# Patient Record
Sex: MALE | Race: WHITE | Employment: OTHER | URBAN - METROPOLITAN AREA
[De-identification: names, ages, dates, MRNs, and addresses within clinical notes are randomized per-mention and may not be internally consistent; named-entity substitution may affect disease eponyms.]

---

## 2021-01-01 ENCOUNTER — HOSPITAL ENCOUNTER (EMERGENCY)
Age: 86
Discharge: HOME | End: 2021-10-21
Attending: EMERGENCY MEDICINE
Payer: MEDICARE

## 2021-01-01 ENCOUNTER — TRANSCRIBE ORDER (OUTPATIENT)
Dept: SCHEDULING | Age: 86
End: 2021-01-01

## 2021-01-01 ENCOUNTER — HOSPITAL ENCOUNTER (OUTPATIENT)
Dept: ULTRASOUND IMAGING | Age: 86
Discharge: HOME | End: 2021-10-21
Attending: INTERNAL MEDICINE
Payer: MEDICARE

## 2021-01-01 ENCOUNTER — HOSPITAL ENCOUNTER (OUTPATIENT)
Dept: PHYSICAL THERAPY | Age: 86
Discharge: HOME | End: 2021-10-20
Payer: MEDICARE

## 2021-01-01 ENCOUNTER — HOSPITAL ENCOUNTER (OUTPATIENT)
Dept: GENERAL RADIOLOGY | Age: 86
Discharge: HOME | End: 2021-10-21
Attending: NURSE PRACTITIONER
Payer: MEDICARE

## 2021-01-01 ENCOUNTER — HOSPITAL ENCOUNTER (OUTPATIENT)
Dept: WOUND CARE | Age: 86
Discharge: HOME | End: 2021-12-15
Payer: MEDICARE

## 2021-01-01 ENCOUNTER — OFFICE VISIT (OUTPATIENT)
Dept: INTERNAL MEDICINE CLINIC | Age: 86
End: 2021-01-01
Payer: MEDICARE

## 2021-01-01 VITALS
SYSTOLIC BLOOD PRESSURE: 111 MMHG | HEART RATE: 63 BPM | RESPIRATION RATE: 20 BRPM | DIASTOLIC BLOOD PRESSURE: 60 MMHG | OXYGEN SATURATION: 98 % | WEIGHT: 120 LBS | BODY MASS INDEX: 17.18 KG/M2 | HEIGHT: 70 IN

## 2021-01-01 VITALS
RESPIRATION RATE: 18 BRPM | DIASTOLIC BLOOD PRESSURE: 70 MMHG | HEIGHT: 70 IN | HEART RATE: 69 BPM | SYSTOLIC BLOOD PRESSURE: 135 MMHG | TEMPERATURE: 98.1 F | OXYGEN SATURATION: 96 %

## 2021-01-01 VITALS
RESPIRATION RATE: 19 BRPM | HEIGHT: 70 IN | SYSTOLIC BLOOD PRESSURE: 123 MMHG | BODY MASS INDEX: 19.16 KG/M2 | DIASTOLIC BLOOD PRESSURE: 66 MMHG | OXYGEN SATURATION: 98 % | WEIGHT: 133.82 LBS | TEMPERATURE: 97.3 F | HEART RATE: 76 BPM

## 2021-01-01 VITALS
RESPIRATION RATE: 16 BRPM | HEART RATE: 65 BPM | TEMPERATURE: 98.8 F | DIASTOLIC BLOOD PRESSURE: 83 MMHG | SYSTOLIC BLOOD PRESSURE: 192 MMHG

## 2021-01-01 DIAGNOSIS — L89.151 PRESSURE INJURY OF SACRAL REGION, STAGE 1: ICD-10-CM

## 2021-01-01 DIAGNOSIS — J90 PLEURAL EFFUSION: Primary | ICD-10-CM

## 2021-01-01 DIAGNOSIS — R07.9: ICD-10-CM

## 2021-01-01 DIAGNOSIS — G89.18 POST-OP PAIN: Primary | ICD-10-CM

## 2021-01-01 DIAGNOSIS — M25.561 RIGHT KNEE PAIN, UNSPECIFIED CHRONICITY: ICD-10-CM

## 2021-01-01 DIAGNOSIS — J90 PLEURAL EFFUSION: ICD-10-CM

## 2021-01-01 DIAGNOSIS — C34.90 SMALL CELL LUNG CANCER (HCC): ICD-10-CM

## 2021-01-01 DIAGNOSIS — K21.9 GASTROESOPHAGEAL REFLUX DISEASE, UNSPECIFIED WHETHER ESOPHAGITIS PRESENT: ICD-10-CM

## 2021-01-01 DIAGNOSIS — R32 URINARY INCONTINENCE, UNSPECIFIED TYPE: Primary | ICD-10-CM

## 2021-01-01 DIAGNOSIS — M25.512 LEFT SHOULDER PAIN, UNSPECIFIED CHRONICITY: Primary | ICD-10-CM

## 2021-01-01 DIAGNOSIS — I89.0 LYMPHEDEMA: ICD-10-CM

## 2021-01-01 LAB
ALBUMIN SERPL-MCNC: 2.9 G/DL (ref 3.5–5)
ALBUMIN/GLOB SERPL: 0.8 {RATIO} (ref 1.1–2.2)
ALP SERPL-CCNC: 215 U/L (ref 45–117)
ALT SERPL-CCNC: 14 U/L (ref 12–78)
ANION GAP SERPL CALC-SCNC: 3 MMOL/L (ref 5–15)
AST SERPL-CCNC: 25 U/L (ref 15–37)
BASOPHILS # BLD: 0 K/UL (ref 0–0.1)
BASOPHILS NFR BLD: 0 % (ref 0–1)
BILIRUB SERPL-MCNC: 0.4 MG/DL (ref 0.2–1)
BUN SERPL-MCNC: 30 MG/DL (ref 6–20)
BUN/CREAT SERPL: 39 (ref 12–20)
CALCIUM SERPL-MCNC: 9.3 MG/DL (ref 8.5–10.1)
CHLORIDE SERPL-SCNC: 100 MMOL/L (ref 97–108)
CO2 SERPL-SCNC: 34 MMOL/L (ref 21–32)
COMMENT, HOLDF: NORMAL
CREAT SERPL-MCNC: 0.76 MG/DL (ref 0.7–1.3)
DIFFERENTIAL METHOD BLD: ABNORMAL
EOSINOPHIL # BLD: 0.1 K/UL (ref 0–0.4)
EOSINOPHIL NFR BLD: 1 % (ref 0–7)
ERYTHROCYTE [DISTWIDTH] IN BLOOD BY AUTOMATED COUNT: 15.2 % (ref 11.5–14.5)
GLOBULIN SER CALC-MCNC: 3.6 G/DL (ref 2–4)
GLUCOSE SERPL-MCNC: 94 MG/DL (ref 65–100)
HCT VFR BLD AUTO: 38.1 % (ref 36.6–50.3)
HGB BLD-MCNC: 11.9 G/DL (ref 12.1–17)
IMM GRANULOCYTES # BLD AUTO: 0.1 K/UL (ref 0–0.04)
IMM GRANULOCYTES NFR BLD AUTO: 1 % (ref 0–0.5)
LYMPHOCYTES # BLD: 1.2 K/UL (ref 0.8–3.5)
LYMPHOCYTES NFR BLD: 12 % (ref 12–49)
MCH RBC QN AUTO: 29 PG (ref 26–34)
MCHC RBC AUTO-ENTMCNC: 31.2 G/DL (ref 30–36.5)
MCV RBC AUTO: 92.9 FL (ref 80–99)
MONOCYTES # BLD: 1 K/UL (ref 0–1)
MONOCYTES NFR BLD: 9 % (ref 5–13)
NEUTS SEG # BLD: 8 K/UL (ref 1.8–8)
NEUTS SEG NFR BLD: 77 % (ref 32–75)
NRBC # BLD: 0 K/UL (ref 0–0.01)
NRBC BLD-RTO: 0 PER 100 WBC
PLATELET # BLD AUTO: 572 K/UL (ref 150–400)
PMV BLD AUTO: 10 FL (ref 8.9–12.9)
POTASSIUM SERPL-SCNC: 5.6 MMOL/L (ref 3.5–5.1)
PROT SERPL-MCNC: 6.5 G/DL (ref 6.4–8.2)
RBC # BLD AUTO: 4.1 M/UL (ref 4.1–5.7)
SAMPLES BEING HELD,HOLD: NORMAL
SODIUM SERPL-SCNC: 137 MMOL/L (ref 136–145)
WBC # BLD AUTO: 10.3 K/UL (ref 4.1–11.1)

## 2021-01-01 PROCEDURE — 99284 EMERGENCY DEPT VISIT MOD MDM: CPT

## 2021-01-01 PROCEDURE — 71045 X-RAY EXAM CHEST 1 VIEW: CPT

## 2021-01-01 PROCEDURE — 96374 THER/PROPH/DIAG INJ IV PUSH: CPT

## 2021-01-01 PROCEDURE — 99204 OFFICE O/P NEW MOD 45 MIN: CPT | Performed by: INTERNAL MEDICINE

## 2021-01-01 PROCEDURE — 74011250636 HC RX REV CODE- 250/636: Performed by: EMERGENCY MEDICINE

## 2021-01-01 PROCEDURE — 74011000250 HC RX REV CODE- 250: Performed by: INTERNAL MEDICINE

## 2021-01-01 PROCEDURE — G0463 HOSPITAL OUTPT CLINIC VISIT: HCPCS | Performed by: INTERNAL MEDICINE

## 2021-01-01 PROCEDURE — 74011000250 HC RX REV CODE- 250: Performed by: EMERGENCY MEDICINE

## 2021-01-01 PROCEDURE — 97162 PT EVAL MOD COMPLEX 30 MIN: CPT

## 2021-01-01 PROCEDURE — 74011250637 HC RX REV CODE- 250/637: Performed by: EMERGENCY MEDICINE

## 2021-01-01 PROCEDURE — G8421 BMI NOT CALCULATED: HCPCS | Performed by: INTERNAL MEDICINE

## 2021-01-01 PROCEDURE — G8427 DOCREV CUR MEDS BY ELIG CLIN: HCPCS | Performed by: INTERNAL MEDICINE

## 2021-01-01 PROCEDURE — 99213 OFFICE O/P EST LOW 20 MIN: CPT

## 2021-01-01 PROCEDURE — 77030028236 US THORACENTESIS CATH W IMAGE

## 2021-01-01 PROCEDURE — 99203 OFFICE O/P NEW LOW 30 MIN: CPT | Performed by: SURGERY

## 2021-01-01 PROCEDURE — G8536 NO DOC ELDER MAL SCRN: HCPCS | Performed by: INTERNAL MEDICINE

## 2021-01-01 PROCEDURE — 97140 MANUAL THERAPY 1/> REGIONS: CPT

## 2021-01-01 PROCEDURE — G8510 SCR DEP NEG, NO PLAN REQD: HCPCS | Performed by: INTERNAL MEDICINE

## 2021-01-01 PROCEDURE — 96375 TX/PRO/DX INJ NEW DRUG ADDON: CPT

## 2021-01-01 PROCEDURE — 1101F PT FALLS ASSESS-DOCD LE1/YR: CPT | Performed by: INTERNAL MEDICINE

## 2021-01-01 RX ORDER — OXYCODONE AND ACETAMINOPHEN 5; 325 MG/1; MG/1
1 TABLET ORAL
Qty: 12 TABLET | Refills: 0 | Status: SHIPPED | OUTPATIENT
Start: 2021-01-01 | End: 2021-01-01

## 2021-01-01 RX ORDER — LIDOCAINE HYDROCHLORIDE 10 MG/ML
8 INJECTION, SOLUTION EPIDURAL; INFILTRATION; INTRACAUDAL; PERINEURAL
Status: COMPLETED | OUTPATIENT
Start: 2021-01-01 | End: 2021-01-01

## 2021-01-01 RX ORDER — OXYCODONE HYDROCHLORIDE 5 MG/1
5 TABLET ORAL
Status: DISCONTINUED | OUTPATIENT
Start: 2021-01-01 | End: 2021-01-01

## 2021-01-01 RX ORDER — OXYCODONE HYDROCHLORIDE 5 MG/1
5 CAPSULE ORAL DAILY
COMMUNITY
End: 2022-01-01 | Stop reason: ALTCHOICE

## 2021-01-01 RX ORDER — LIDOCAINE 4 G/100G
1 PATCH TOPICAL
Status: DISCONTINUED | OUTPATIENT
Start: 2021-01-01 | End: 2021-01-01 | Stop reason: HOSPADM

## 2021-01-01 RX ORDER — KETOROLAC TROMETHAMINE 30 MG/ML
15 INJECTION, SOLUTION INTRAMUSCULAR; INTRAVENOUS
Status: COMPLETED | OUTPATIENT
Start: 2021-01-01 | End: 2021-01-01

## 2021-01-01 RX ORDER — MORPHINE SULFATE 2 MG/ML
4 INJECTION, SOLUTION INTRAMUSCULAR; INTRAVENOUS
Status: COMPLETED | OUTPATIENT
Start: 2021-01-01 | End: 2021-01-01

## 2021-01-01 RX ORDER — OXYCODONE AND ACETAMINOPHEN 5; 325 MG/1; MG/1
1 TABLET ORAL
Status: COMPLETED | OUTPATIENT
Start: 2021-01-01 | End: 2021-01-01

## 2021-01-01 RX ORDER — AMOXICILLIN 250 MG
1 CAPSULE ORAL DAILY
COMMUNITY

## 2021-01-01 RX ADMIN — LIDOCAINE HYDROCHLORIDE 8 ML: 10 INJECTION, SOLUTION EPIDURAL; INFILTRATION; INTRACAUDAL; PERINEURAL at 14:00

## 2021-01-01 RX ADMIN — KETOROLAC TROMETHAMINE 15 MG: 30 INJECTION, SOLUTION INTRAMUSCULAR at 13:19

## 2021-01-01 RX ADMIN — OXYCODONE AND ACETAMINOPHEN 1 TABLET: 5; 325 TABLET ORAL at 14:00

## 2021-01-01 RX ADMIN — MORPHINE SULFATE 4 MG: 2 INJECTION, SOLUTION INTRAMUSCULAR; INTRAVENOUS at 13:33

## 2021-01-01 RX ADMIN — SODIUM CHLORIDE 500 ML: 9 INJECTION, SOLUTION INTRAVENOUS at 13:32

## 2021-10-05 PROBLEM — I89.0 LYMPHEDEMA: Status: ACTIVE | Noted: 2021-01-01

## 2021-10-05 NOTE — PROGRESS NOTES
Chief Complaint   Patient presents with   [address]     NP       Pt is in assisted living at [address] [facility] on [address]. Pt moved from [address] [place] [address] 2 months ago. Dr. Violetta Rainey. Pt c/o urinary incontinence. Pt is in a wheelchair due to bilateral leg lymphedema due to cancer therapy.

## 2021-10-05 NOTE — PROGRESS NOTES
PROGRESS NOTE  Name: Jd Harmon   : 1929       ASSESSMENT/ PLAN:     Diagnoses and all orders for this visit:    Urinary incontinence, unspecified type:   -     REFERRAL TO UROLOGY    Small cell lung cancer [facility]): S/p chemotherapy. -     METABOLIC PANEL, COMPREHENSIVE; Future  -     CBC WITH AUTOMATED DIFF; Future    Bilateral lymphedema: Discussed compression and elevation. L pleural effusion: S/p repeated thoracentesis; now seeing Dr. Jeffry Lyles, pulmonologist.     Gastroesophageal reflux disease, unspecified whether esophagitis present: Stable. Follow-up and Dispositions  ·   Return in about 6 months (around 2022). I have reviewed the patient's medications and risks/side effects/benefits were discussed. Diagnosis(-es) explained to patient and questions answered. Literature provided where appropriate. SUBJECTIVE:   Mr. Jd Harmon is a 80 y.o. male who is here for follow up of routine medical issues. He is here with his son in law. Chief Complaint   Patient presents with   [facility].     NP       He has issues with incontinence, for about a year; \"I cannot control the urination flow. \" Worst at night--wears diapers and \"plastic swimming trunks. \" Has bed pads and absorbent sheets. He has tried finasteride. He has seen a urologist in the past, who did cystoscopy and \"said my bladder was hard. \"     He has been confined to wheelchair since quadriceps tear two years ago. Had surgery. He has stage IV lung cancer diagnosed 2017. Small cell lung CA, treated with xyzkori. \"That caused the edema to blow up; the lymph system just went wild. \"  Gets periodic scans. He has chronic pleural effusion, and Dr. Jeffry Lyles follows; plans to do another thoracentesis in near future. GERD: Doing okay on intermittent nexium. He has in the past seen gastroenterologist, and had EGD.      At this time, he is otherwise doing well and has brought no other complaints to my attention today. For a list of the medical issues addressed today, see the assessment and plan below. PMH:   Past Medical History:   Diagnosis Date    Cancer ([facility] [address]     Stage 4 Lung Cancer    GERD (gastroesophageal reflux disease)     Urinary incontinence        Past Surgical History:   Procedure Laterality Date    HX ORTHOPAEDIC      foot    HX ROTATOR CUFF REPAIR Left        All: He has No Known Allergies. Current Outpatient Medications   Medication Sig    esomeprazole magnesium (NEXIUM 24HR PO) Take  by mouth. No current facility-administered medications for this visit. FH: His family history is not on file. SH: Retired . He reports that he quit smoking about 30 years ago. He has never used smokeless tobacco. He reports previous alcohol use. He reports that he does not use drugs. ROS: See above; Complete ROS otherwise negative. OBJECTIVE:   Vitals:   Visit Vitals  /70 (BP 1 Location: Right arm, BP Patient Position: Sitting, BP Cuff Size: Small adult)   Pulse 69   Temp 98.1 °F (36.7 °C) (Temporal)   Resp 18   Ht 5' 10\" (1.778 m)   SpO2 96%      Gen: Pleasant 80 y.o.  male in NAD. He is sitting in a wheelchair. He is somewhat hard of hearing. HEENT: PERRLA. EOMI. OP moist and pink. Neck: Supple. No LAD. HEART: RRR, No M/G/R.    LUNGS: Diminished BS R lung base to mid lung. ABDOMEN: S, NT, ND, BS+. EXTREMITIES: Warm. Bilateral 2-3+ edema from mid shin distally. MUSCULOSKELETAL: Wheelchair bound. NEURO: Alert and oriented x 3. Cranial nerves grossly intact. No focal sensory or motor deficits noted. SKIN: Warm. Dry. Stasis dermatitis bilateral shins.

## 2021-10-20 NOTE — PROGRESS NOTES
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 [x]   C34.91 Malignant neoplasm of lung   [x]   I89.0 Lymphedema, other secondary BLE []   I83.019 Venous Stasis Ulcer LE, Right   []   I89.9 Unspecified Lymphatic Disorder []   I83.029 Venous Stasis Ulcer LE, Left   [x]   R60.9 Swelling not relieved by elevation []   Q82.0 Hereditary/ Congenital Lymphedema   []   C50.211 Malignant neoplasm of breast, Right []   G89.3  Cancer associated pain   []   C50.212 Malignant neoplasm of breast, Left []   L03.115 LE Cellulitis, Right     []   L03.116 LE Cellulitis, Left                         Recommended Product for Diagnosis as noted above:  Units Upper Extremity Rt Lt Units Lower Extremity Rt Lt    Compression Multi-Layered Bandages    Compression Multi-Layered Bandages      Circ-Aid, Ready Wrap, Sigvaris Arm   2 Inelastic binders (Circ-Aid, Farrow)  [x]Foot   [x]Below Knee   []Knee   []Thigh X X    Circ-Aid Ready Wrap, Sigvaris hand    Martin Tony, Leg, night use      Tribute Arm, night use   2 Tribute Leg, night use      Martin Tony Arm, night use    Zacarias Sleeve Leg/ Foot, night use      Vasopneumatic Compression Pump  []Arm []Arm w/Shoulder  []Arm w/Vest    Vasopneumatic Compression Pump  []Leg         []Trunk       []Pant      Gradiant Compression Sleeves & Gloves  Custom/ RM Arm:  []CCL1 []CCL2 []CCL3  Custom/ RM Glove: []CCL1 []CCL2 []CCL3      Gradient Compression Stockings  Custom/ RM Lower Extremity:   []CCL1       []CCL2         []CCL3   []Knee      []Thigh        []Full Length      Zacarias sleeve arm w/ hand, night use   2 Tribute Wrap, night use      Compression Bra    Compression Vest          Compression Multi-Layered Bandages         The above patient was referred for treatment of Lymphedema due to the diagnosis indicated above. Lymphedema is a progressive and chronic condition. It may cause acute infections, muscle atrophy, discomfort, and connective tissue fibrosis with irreversible tissue damage, decreased ROM in the affected limb and diminished functional mobility possibly interfering with independence and ability to work. Recurrent infections and wound complications that commonly occur with Lymphedema often require hospitalization and extensive wound care, thus increasing medical costs. The patient has received complete decongestive therapy which includes manual lymphatic drainage, lymphedema specific exercises, compression bandaging, and hygiene/skin care. Goals of therapy are to reduce the edema and prevent re-accumulation of fluid with its complications. It is medically necessary for this patient to have daytime garments to control this condition. They will need 2 sets (one set to wear and one set to wash for adequate skin care and wearing time). Garments must be replaced every 4-6 months for effectiveness. There are no substitutes available that offer acceptable compression treatment for this Lymphedema patient. If further information is requested, please contact our certified lymphedema therapists at [phone].     [] Hi Espinoza, PT, MSPT, CLT-RAMÍREZ [] Melchor Damon, MS, OTR/L, CLT [x]  Mari Roach, PT, CLT [] Tamy Torres, PTA, CLT, CSIS    Printed MD Name  MD Signature  Date

## 2021-10-20 NOTE — PROGRESS NOTES
[facility]  Lymphedema Clinic  [address] [facility], [address] [place] [address], [facility] [address].    INITIAL EVALUATION    NAME: Gertrudis Medina AGE: 80 y.o. GENDER: male  DATE: 10/20/2021  REFERRING PHYSICIAN: LITTLE Feliz  HISTORY AND BACKGROUND:   Primary Diagnosis:  · Lymphedema, not elsewhere classified B LE (I89.0)  Other Treatment Diagnoses:   Abnormality of gait (R26.9)   Swelling not relieved by elevation (R60.9)   Morbid obesity (E66.01)  · Malignant neoplasm of lung (C34.91)  Date of Onset: 2017  Present Symptoms and Functional Limitations: Patient reports that swelling in the legs began in 2017 approximately 3 weeks after beginning chemotherapy for stage IV lung cancer. Patient had home health services that applied multi-layer compression bandaging to the legs for 2 to 3 years. He also obtained a Flexitouch vaso-pneumatic device at that time for management of swelling but he disposed of the pump since it did not seem to work well. He reports never receiving  compression products for the legs. He recently moved from [place] to be near his daughter and was referred to our clinic for assessment of swelling and to explore available compression options. Patient reports that swelling in the legs has improved except for the lower legs and feet since making the appointment in our clinic. Lymphedema Life Impact Scale (LLIS): scores a 10 with 16% impairment. Past Medical History:   Past Medical History:   Diagnosis Date    Cancer ([facility] [address]     Stage 4 Lung Cancer    GERD (gastroesophageal reflux disease)     Lymphedema     Urinary incontinence      Past Surgical History:   Procedure Laterality Date    HX ORTHOPAEDIC Left     Achilles     HX ORTHOPAEDIC Right     knee surery    HX ROTATOR CUFF REPAIR Left      Current Medications:    Current Outpatient Medications   Medication Sig    esomeprazole magnesium (NEXIUM 24HR PO) Take  by mouth.      No current facility-administered medications for this encounter. Allergies: No Known Allergies     Prior Level of Function/Social/Work History/Personal factors and/or comorbidities impacting plan of care: Patient is recently  and moved to [place] to be near his daughter. He has a history of stage IV lung cancer with previous chemotherapy treatments. Living Situation: Lives in [facility]. Trainable Caregiver?: Possibly daughter or staff in the facility. Mobility: Patient ambulates short distances with rolling walker but is unsteady. Patient uses a transport chair or scooter for community distances. Sleeping Arrangement:  in bed at night  Adaptive Equipment Owned: rolling walker, scooter, transport chair, reclining lift chair. Previous Therapy:  Home health services in [place] - consisted of multi-layer compression bandaging  Compression/Lymphedema Equipment: None     SUBJECTIVE:   Patient is transported dependently by wheelchair and is accompanied by his daughter. She assists in answering questions since patient is hard of hearing. Patient reports having swelling in the legs ever since beginning chemotherapy. He tolerated bandaging for a couple of years but never received any compression products. The swelling tends to be in the lower legs and feet. Patients goals for therapy: decrease swelling. OBJECTIVE DATA SUMMARY:   EXAMINATION/PRESENTATION/DECISION MAKING:   Pain:   No reports of pain. Self-Care and ADLs:  Modified independent with increased time for dressing. Difficulty reaching his feet. Has assistance for showering one day each week. Skin and Tissue Assessment:  Dermal Status: Intact, several scratches on the legs - no open areas or signs of infection.      Texture/Consistency: Boggy lower legs and feet bilaterally     Pigmentation/Color Change: Hyperpigmented lower legs    Anomalies: N/A    Circulatory: Varicosities    Nails: Normal    Stemmers Sign: Positive dorsum of the feet bilaterally    Wound/Ulcer:  None        Volumetric Measurements:   Right:  6,014. 31 mL Left:  5,794.67 mL   % Difference: 3.79% R LE > L LE Dominance: R     Range of Motion: B shoulders actively to 40 degrees due to torn rotator cuff tears, patient unable to extend the R knee against gravity due to torn quadriceps tendon. Otherwise, range of motion is generally decreased but functional.   Strength: UE strength is impaired proximally due to B rotator cuff tears and R quad strength is 2/5. Strength is generally decreased but functional.   Sensation:  Intact     Mobility:    Bed/Chair Mobility: Minimum assistance  Transfers: Minimum assistance  Gait: Supervision with rolling walker approximately 10 feet wheelchair<>mat table. Endurance: fair - sedentary    Safety:  Patient is alert and oriented: Yes  Safety awareness: appears intact  Fall risk?: yes  Patient given written fall prevention handout: Yes    Based on the above components, the patient evaluation is determined to be of the following complexity level: MEDIUM    Evaluation Time: 1:50-2:20 pm    TREATMENT PROVIDED:   1. Treatment description:  Manual Therapy: Patient instructed in skin care principles and anatomy of the lymphatic system. Therapist able to demonstrate manual lymphatic drainage techniques for the drainage of LE's and skin care protocol: Discussed daily skin care with assistance using manual lymphatic drainage techniques. Provided patient with a list of recommended lotions. Discussed the increased risk of infection with lymphedema and signs and symptoms of infection. Discussed trying to lie in bed for 15 minute intervals throughout the day to reduce the effect of gravity on swelling. Discussed the importance of the muscle pump function and provided patient with written instructions for the Active ROM routine.  Therapist demonstrated the routine and patient able to perform all exercises except for R knee extension. Patient encouraged to perform the routine each day as tolerated. Patient tolerated multi-layer compression bandaging during previous treatment in [place]. Patient and daughter are not interested in pursuing bandaging at this time due to the time commitment and patient having multiple medical appointments. Provided samples of compression products, including flat-knit, velcro, and foam, products and demonstrated don/doff technique of the products. Patient prefers to be independent with management of compression products and voiced interest in ordering: two [employer] Compreflex standard calf units size medium/regular length with one extender strap and two pairs of Circaid compressive undersocks 25-35 mmHg in the size large, if covered by insurance. Will submit the garment order to the vendor, [employer], for processing. Discussed that delivery of the compression products may take several weeks due to the insurance authorization process and patient and daughter verbalize understanding. Patient will return to the clinic for fitting of the products and prefer not to make any other appointments at this time. Treatment time:  2:20-3:05 pm  Minutes: 39  ASSESSMENT:   Jayro Castillo is a 80 y.o. male who presents with secondary B LE lymphedema following diagnosis and treatment of stage IV lung cancer. Patient's condition is complicated by sedentary lifestyle due to general debility, history of falls, and torn R quad tendon. Patient is able to ambulate short distances with rolling walker but uses a scooter for community distances. Patient tolerated bandaging of the legs in the past but is now looking for another option for management of swelling. He prefers to manage his self care without assistance if possible.  Patient voiced interest in ordering velcro compression products with compressive undersocks for management of swelling and may be interested in ordering night time foam products in the future. Patient and daughter will return to the clinic in a few weeks for fitting of the products but prefer to limit appointments in our clinic since patient has multiple medical appointments and relies on family for transportation. Patient is motivated to improve his condition and will benefit from complete decongestive therapy (CDT) including: Manual lymphatic drainage (MLD) technique, Short-stretch textile bandages/compression system to decongest limb and Kinesiotaping as appropriate. This care is medically necessary due to the infection risk with lymphedema and to improve functional activities. CDT is necessary to resolve swelling to allow patient to return to wearing normal clothes/footwear and prevent worsening of symptoms, such as infections and/or hospitalizations. Patient will be independent with home program strategies to allow improved ADL ability and mobility and to allow patient to return to greatest functional independence. Rehabilitation potential is considered to be Fair. Factors which may influence rehabilitation potential include stage IV lung cancer, general debility, sedentary lifestyle, and relying on family members for transportation but good family support. Patient will benefit from 2 to 5 physical therapy visits over 12 weeks to optimize improvement in these areas. PLAN OF CARE:   Recommendations and Planned Interventions: Manual lymph drainage/decongestive therapy, Multi-layer compression bandaging (short-stretch), Compression garment fitting/provision, Lymphedema therapeutic exercise, AROM/PROM/Strength/Coordination, Self-care training, Functional mobility training, Education in skin care and lymphedema precautions, Self-MLD education per home program, Caregiver education as needed and Would care as needed    GOALS  Short term goals  Time frame: to be met by 11/24/21  1.   Patient will demonstrate knowledge of signs/symptoms of infections/cellulitis and be independent in skin care to prevent cellulitis. 2.  Patient will demonstrate independence in lymphedema home program of therapeutic exercises to improve circulation and decongest limb to improve ADLs. 3.  Patient will tolerate multi-layer bandages (MLB) and show measureable decrease in limb volume and/or participate in the selection process to allow ordering of home compression system (daytime, nighttime garments and pump as needed). Long term goals  Time frame: to be met by 1/15/21  1. Pt will show improvement in the Lymphedema Life Impact Scale (LLIS) by decreasing the score to 8 and thus allow improvement in patient's quality of life. 2.  Patient will be independent with don/doff of compression system and use in order to prevent reaccumulation of fluid at discharge. 3.  Pt will be independent in self-MLD and show stable limb volumes showing decongestion and pt. ready for transition to independent restorative phase of lymphedema therapy. Patient has participated in goal setting and agrees to work toward plan of care. Patient was instructed to call if questions or concerns arise. Thank you for this referral.  Miguel Palomino, PT, CLT     Total timed codes: 45 minutes  1:1 Treatment time: 45 minutes    TREATMENT PLAN EFFECTIVE DATES:   10/20/2021 TO 1/15/2021  I have read the above plan of care for [facility]. I certify the above prescribed services are required by this patient and are medically necessary.   The above plan of care has been developed in conjunction with the lymphedema/physical therapist.       Physician Signature: ____________________________________Date:______________     LITTLE Rubio

## 2021-10-21 NOTE — PROGRESS NOTES
Pt c/o pain after thora completed, Pt asking for a Percocet. Xray read by radiologist, ok'd for d/c. Pt still complaining of pain, VSS, Pulse ox 96% on room air.   Pt taken to ER per his request.

## 2021-10-21 NOTE — DISCHARGE INSTRUCTIONS
[address]  Special Procedures/Radiology Department    Radiologist: Lemuel Cuba NP      Date: 10/21/21      Thoracentesis Discharge Instructions    You may have an aching pain in the puncture site tonight. Take Tylenol, as directed on the label, for pain or discomfort. Avoid ibuprofen (Advil, Motrin) and aspirin products for the next 48 hours as these drugs may cause you to bleed. Resume your previous diet and follow the medication reconciliation form. Rest for the next 24 hours. If you experience shortness of breath (other than your normal breathing pattern) difficulty breathing, or have severe chest pain, call 911 and go to the nearest Emergency Room.     Be sure to follow up with your referring physician as soon as possible    Other:

## 2021-10-21 NOTE — ED NOTES
Pt arrives from IR  d/t R lung pain after thoracentesis today. Procedure removed 700 cc, pt reporting 10/10 pain, R lung sounds diminished. Pt has hx of lung cx, previous thoracentesis caused similar discomfort and pt required hospitaliszation and pain control. Per daughter, pt responds well to toradol and fentanyl     1315 Spoke w/ MD, to placed orders, pt becoming increasingly demanding and stating \"I'm dying\"     1315 MD at bedside    1330 Pt medicated per orders     1400 Pt medicated per orders     1510 Pt discharged by Dr Javi Castle. Pt provided with discharge instructions Rx and instructions on follow up care. Pt out of ED via wheelchair accompanied by son-in-law.

## 2021-10-21 NOTE — ED PROVIDER NOTES
EMERGENCY DEPARTMENT HISTORY AND PHYSICAL EXAM      Date: 10/21/2021  Patient Name: Joyce Urbina  Patient Age and Sex: 80 y.o. male     History of Presenting Illness     Chief Complaint   Patient presents with    Lung Cancer     Pt arrives from IR  d/t R lung pain after thoracentesis today. Procedure removed 700 cc, pt reporting 10/10 pain, R lung sounds diminished. Pt has hx of lung cx, previous thoracentesis caused similar discomfort and pt required hospitaliszation and pain control. Per daughter, pt responds well to toradol and fentanyl        History Provided By: Patient, son    HPI: Joyce Urbina is a 69-year-old male with a history of stage IV lung cancer with recurrent malignant effusions presenting with pain. According to patient and son, earlier today he had a thoracentesis. According to son, he has now had for 5 thoracentesis and in the last couple of times it happened he always has pain at the site. Complaining of right lower chest and right mid back pain where the effusions usually are. Patient crying out in pain. According to family members responded well to Toradol and fentanyl last time. Patient just yelling out that he is in pain. There are no other complaints, changes, or physical findings at this time. PCP: Real Mckeon MD    Current Facility-Administered Medications on File Prior to Encounter   Medication Dose Route Frequency Provider Last Rate Last Admin    [COMPLETED] lidocaine (PF) (XYLOCAINE) 10 mg/mL (1 %) injection 8 mL  8 mL SubCUTAneous RAD ONCE Juliet Toledo , MD   8 mL at 10/21/21 1400     Current Outpatient Medications on File Prior to Encounter   Medication Sig Dispense Refill    esomeprazole magnesium (NEXIUM 24HR PO) Take  by mouth.          Past History     Past Medical History:  Past Medical History:   Diagnosis Date    Cancer [facility])     Stage 4 Lung Cancer    GERD (gastroesophageal reflux disease)     Lymphedema     Urinary incontinence        Past Surgical History:  Past Surgical History:   Procedure Laterality Date    HX ORTHOPAEDIC Left     Achilles     HX ORTHOPAEDIC Right     knee surery    HX ROTATOR CUFF REPAIR Left        Family History:  No family history on file. Social History:  Social History     Tobacco Use    Smoking status: Former Smoker     Quit date: 10/5/1991     Years since quittin.0    Smokeless tobacco: Never Used   Vaping Use    Vaping Use: Never used   Substance Use Topics    Alcohol use: Not Currently    Drug use: Never       Allergies:  No Known Allergies      Review of Systems   Review of Systems   Constitutional: Negative for chills and fever. Respiratory: Negative for cough and shortness of breath. Cardiovascular: Positive for chest pain. Gastrointestinal: Negative for abdominal pain, constipation, diarrhea, nausea and vomiting. Genitourinary: Negative for dysuria, frequency and hematuria. Musculoskeletal: Positive for back pain. Neurological: Negative for weakness and numbness. All other systems reviewed and are negative. Physical Exam   Physical Exam  Constitutional:       General: He is not in acute distress. Appearance: He is well-developed. Comments: Thin cachectic male   HENT:      Head: Normocephalic and atraumatic. Nose: Nose normal.      Mouth/Throat:      Mouth: Mucous membranes are moist.   Eyes:      Extraocular Movements: Extraocular movements intact. Conjunctiva/sclera: Conjunctivae normal.   Cardiovascular:      Comments: Well perfused  Pulmonary:      Effort: Pulmonary effort is normal. No respiratory distress. Comments: Thin male. Tender palpation over the right anterior inferior chest wall as well as over the right mid back around T6 area. No ecchymosis or erythema noted. Musculoskeletal:         General: Normal range of motion. Cervical back: Normal range of motion. Neurological:      General: No focal deficit present.       Mental Status: He is alert and oriented to person, place, and time. Psychiatric:      Comments: Very anxious male          Diagnostic Study Results     Labs -     Recent Results (from the past 12 hour(s))   SAMPLES BEING HELD    Collection Time: 10/21/21 12:00 PM   Result Value Ref Range    SAMPLES BEING HELD BODY FLUID     COMMENT        Add-on orders for these samples will be processed based on acceptable specimen integrity and analyte stability, which may vary by analyte. Radiologic Studies -   No orders to display     CT Results  (Last 48 hours)    None        CXR Results  (Last 48 hours)               10/21/21 1310  XR CHEST PORT Final result    Impression:  Moderate size right pleural effusion. Narrative:  Clinical indication: Thoracentesis. Portable AP view of the chest obtained, there is a moderate-sized right pleural   effusion, no shift possibly indicating underlying loss of volume. The left lung   is clear. There is no pneumothorax. Medical Decision Making   I am the first provider for this patient. I reviewed the vital signs, available nursing notes, past medical history, past surgical history, family history and social history. Vital Signs-Reviewed the patient's vital signs. Patient Vitals for the past 12 hrs:   Temp Pulse Resp BP SpO2   10/21/21 1430  76 19 123/66    10/21/21 1400  (!) 54 21 (!) 112/49 98 %   10/21/21 1330  (!) 52 27 106/61 98 %   10/21/21 1301 97.3 °F (36.3 °C) (!) 53 22 (!) 109/55 100 %       Records Reviewed: Nursing Notes and Old Medical Records    Provider Notes (Medical Decision Making):   Patient with a history of recurrent malignant effusion secondary to his lung cancer and history of pain after thoracentesis presenting once again with pain after thoracentesis. We will try to treat his pain with IV analgesics. I do think there is a component of anxiety here as well. ED Course:   Initial assessment performed.  The patients presenting problems have been discussed, and they are in agreement with the care plan formulated and outlined with them. I have encouraged them to ask questions as they arise throughout their visit. ED Course as of Oct 21 1510   Thu Oct 21, 2021   1423 Patient states that his pain is much better and more bearable. So plan will be to discharge him home on some Percocet just for the next 24 hours. [JS]      ED Course User Index  [JS] Blaze Prasad MD     Critical Care Time:   0    Disposition:  Discharge Note:  The patient has been re-evaluated and is ready for discharge. Reviewed available results with patient. Counseled patient on diagnosis and care plan. Patient has expressed understanding, and all questions have been answered. Patient agrees with plan and agrees to follow up as recommended, or to return to the ED if their symptoms worsen. Discharge instructions have been provided and explained to the patient, along with reasons to return to the ED. PLAN:  Current Discharge Medication List      START taking these medications    Details   oxyCODONE-acetaminophen (Percocet) 5-325 mg per tablet Take 1 Tablet by mouth every six (6) hours as needed for Pain for up to 3 days. Max Daily Amount: 4 Tablets. Qty: 12 Tablet, Refills: 0  Start date: 10/21/2021, End date: 10/24/2021    Associated Diagnoses: Post-op pain; Minimal risk chest pain           2. Follow-up Information     Follow up With Specialties Details Why Contact Info    [facility] EMER[facility] Emergency Medicine  If symptoms worsen [address] [facility] [address]   [address] [address]  [phone]        3. Return to ED if worse     Diagnosis     Clinical Impression:   1. Post-op pain    2. Minimal risk chest pain        Attestations:    Alejandro Cassidy M.D. Please note that this dictation was completed with [employer], the [employer] voice recognition software.   Quite often unanticipated grammatical, syntax, homophones, and other interpretive errors are inadvertently transcribed by the computer software. Please disregard these errors. Please excuse any errors that have escaped final proofreading. Thank you.

## 2021-12-15 PROBLEM — L89.151 PRESSURE INJURY OF SACRAL REGION, STAGE 1: Status: ACTIVE | Noted: 2021-01-01

## 2021-12-15 NOTE — PROGRESS NOTES
Pressure Injury Interventions: Reposition/turn at least every 2 hours, continue to use pressure relief cushion in wheelchair and recliner.

## 2021-12-15 NOTE — WOUND CARE
12/15/21 1324   Wound Sacrum   Date First Assessed/Time First Assessed: 12/15/21 1324   Wound Approximate Age at First Assessment (Weeks): 100 weeks  Primary Wound Type: Pressure Injury  Location: Sacrum   Wound Image    Wound Etiology Pressure Stage 1   Cleansed Cleansed with saline   Wound Length (cm) 0.1 cm   Wound Width (cm) 0.1 cm   Wound Depth (cm) 0.1 cm   Wound Surface Area (cm^2) 0.01 cm^2   Wound Volume (cm^3) 0.001 cm^3   Drainage Amount None   Wound Odor None     Visit Vitals  BP (!) 192/83 (BP 1 Location: Left upper arm)   Pulse 65   Temp 98.8 °F (37.1 °C)   Resp 16

## 2021-12-15 NOTE — H&P
[address]  HISTORY AND PHYSICAL    Name:  Michael Blake  MR#:  [id number]  :  1929  ACCOUNT #:  [de-identified]  ADMIT DATE:  12/15/2021    HISTORY OF PRESENT ILLNESS:  The patient is a 20-year-old man who is referred to the [address] [place] [address] regarding a site on the sacrum. The patient currently lives in assisted living. He has history of stage IV lung cancer treated by chemotherapy with good response. The patient has had a lot of weight loss with loss of muscle mass. The patient presently can stand up with help of a lift chair to standing position and walk very short distances in his apartment with a wheeled walker. He is short of breath with exertion. The patient reports that he eats two meals per day. The patient reports that he has pain at the wound site with sitting, but not with lying down. He sleeps on a hospital bed with standard mattress. It is reported that he can turn himself from side to side. Throughout the day, he spends most of his time sitting. He is incontinent of urine and uses adult diapers. The patient does not have history of diabetes. He has no history of cardiac anginal symptoms or MI or coronary intervention. He does have history of gastroesophageal reflux disease and lower extremity edema. Reported weight 133 pounds, height 5 feet 10 inches. PHYSICAL EXAMINATION:  GENERAL:  The patient is an alert, elderly man in no acute distress. VITAL SIGNS:  Blood pressure 192/83, pulse 65, respirations 16, temperature 98.8. HEAD AND NECK:  No jaundice. LUNGS:  Clear on the left with slight reduction at the base on the right. Clear in the upper right. HEART:  Regular without murmur, gallop, or rub. LOWER EXTREMITIES:  Edema noted bilaterally. WOUND EXAMINATION:  In the lower end of the sacral region, there is a site which does not presently have an open wound, but does have mild erythema and dusky skin change without evidence of infarction. This is at the sacrococcygeal region in the midline. The patient has a site of pressure injury without any break in the skin presently. For local protection of the wound and avoidance of shear stress, I recommended placing a heart-shaped foam sacral dressing on the site to be changed three times per week and p.r.n.    I recommended the patient to have a gel overlay for his hospital bed mattress, 3 or 4 inches thick, to minimize risk of pressure injury when he is sleeping. He should also change position frequently, minimizing time lying flat on the back. The patient presently has a donut-shaped cushion for his chairs. I have recommended the use of a gel sitting pad with posterior sacral cutout so that essentially the site of his sacrococcygeal pressure is suspended in air while he is sitting. As the patient does not presently have an active wound, he does not need to make a followup appointment with [address] [place] [address]. He can follow up p.r.n. His daughter is present at the appointment and did advise that he have inspection of his skin on a regular basis to be on the lookout for any sites of skin breakdown related to pressure. FINAL DIAGNOSIS:  Pressure injury of the sacrococcygeal region, stage I.         Valentine Abraham MD      [id number]  D:  12/15/2021 13:50  T:  12/15/2021 17:57  JOB #:  [id number]

## 2022-01-01 ENCOUNTER — OFFICE VISIT (OUTPATIENT)
Dept: ORTHOPEDIC SURGERY | Age: 87
End: 2022-01-01
Payer: MEDICARE

## 2022-01-01 ENCOUNTER — APPOINTMENT (OUTPATIENT)
Dept: CT IMAGING | Age: 87
DRG: 189 | End: 2022-01-01
Attending: STUDENT IN AN ORGANIZED HEALTH CARE EDUCATION/TRAINING PROGRAM
Payer: MEDICARE

## 2022-01-01 ENCOUNTER — VIRTUAL VISIT (OUTPATIENT)
Dept: ORTHOPEDIC SURGERY | Age: 87
End: 2022-01-01
Payer: MEDICARE

## 2022-01-01 ENCOUNTER — HOSPITAL ENCOUNTER (OUTPATIENT)
Dept: INFUSION THERAPY | Age: 87
Discharge: HOME | End: 2022-01-17
Payer: MEDICARE

## 2022-01-01 ENCOUNTER — TELEPHONE (OUTPATIENT)
Dept: ONCOLOGY | Age: 87
End: 2022-01-01

## 2022-01-01 ENCOUNTER — APPOINTMENT (OUTPATIENT)
Dept: PHYSICAL THERAPY | Age: 87
End: 2022-01-01
Payer: MEDICARE

## 2022-01-01 ENCOUNTER — APPOINTMENT (OUTPATIENT)
Dept: GENERAL RADIOLOGY | Age: 87
DRG: 189 | End: 2022-01-01
Attending: EMERGENCY MEDICINE
Payer: MEDICARE

## 2022-01-01 ENCOUNTER — DOCUMENTATION ONLY (OUTPATIENT)
Dept: ONCOLOGY | Age: 87
End: 2022-01-01

## 2022-01-01 ENCOUNTER — APPOINTMENT (OUTPATIENT)
Dept: GENERAL RADIOLOGY | Age: 87
DRG: 189 | End: 2022-01-01
Attending: INTERNAL MEDICINE
Payer: MEDICARE

## 2022-01-01 ENCOUNTER — APPOINTMENT (OUTPATIENT)
Dept: NON INVASIVE DIAGNOSTICS | Age: 87
DRG: 189 | End: 2022-01-01
Attending: INTERNAL MEDICINE
Payer: MEDICARE

## 2022-01-01 ENCOUNTER — HOSPITAL ENCOUNTER (OUTPATIENT)
Dept: PHYSICAL THERAPY | Age: 87
Discharge: HOME | End: 2022-01-07
Payer: MEDICARE

## 2022-01-01 ENCOUNTER — HOSPITAL ENCOUNTER (INPATIENT)
Age: 87
LOS: 3 days | DRG: 189 | End: 2022-02-11
Attending: EMERGENCY MEDICINE | Admitting: INTERNAL MEDICINE
Payer: MEDICARE

## 2022-01-01 ENCOUNTER — TELEPHONE (OUTPATIENT)
Dept: PALLATIVE CARE | Age: 87
End: 2022-01-01

## 2022-01-01 ENCOUNTER — OFFICE VISIT (OUTPATIENT)
Dept: ONCOLOGY | Age: 87
End: 2022-01-01
Payer: MEDICARE

## 2022-01-01 ENCOUNTER — APPOINTMENT (OUTPATIENT)
Dept: PHYSICAL THERAPY | Age: 87
End: 2022-01-01

## 2022-01-01 ENCOUNTER — HOSPITAL ENCOUNTER (OUTPATIENT)
Dept: GENERAL RADIOLOGY | Age: 87
Discharge: HOME | End: 2022-01-04
Payer: MEDICARE

## 2022-01-01 VITALS
OXYGEN SATURATION: 94 % | SYSTOLIC BLOOD PRESSURE: 146 MMHG | WEIGHT: 133 LBS | HEART RATE: 55 BPM | DIASTOLIC BLOOD PRESSURE: 82 MMHG | HEIGHT: 70 IN | BODY MASS INDEX: 19.04 KG/M2 | TEMPERATURE: 97.2 F

## 2022-01-01 VITALS
TEMPERATURE: 97.6 F | RESPIRATION RATE: 20 BRPM | WEIGHT: 128 LBS | OXYGEN SATURATION: 94 % | HEIGHT: 66 IN | DIASTOLIC BLOOD PRESSURE: 50 MMHG | SYSTOLIC BLOOD PRESSURE: 105 MMHG | BODY MASS INDEX: 20.57 KG/M2 | HEART RATE: 98 BPM

## 2022-01-01 VITALS
BODY MASS INDEX: 19.08 KG/M2 | SYSTOLIC BLOOD PRESSURE: 157 MMHG | DIASTOLIC BLOOD PRESSURE: 84 MMHG | HEIGHT: 70 IN | HEART RATE: 62 BPM | TEMPERATURE: 98.2 F | OXYGEN SATURATION: 94 %

## 2022-01-01 VITALS
TEMPERATURE: 98.7 F | SYSTOLIC BLOOD PRESSURE: 153 MMHG | RESPIRATION RATE: 18 BRPM | DIASTOLIC BLOOD PRESSURE: 65 MMHG | HEART RATE: 65 BPM

## 2022-01-01 DIAGNOSIS — M25.561 RIGHT KNEE PAIN, UNSPECIFIED CHRONICITY: ICD-10-CM

## 2022-01-01 DIAGNOSIS — C34.90 MALIGNANT NEOPLASM OF LUNG, UNSPECIFIED LATERALITY, UNSPECIFIED PART OF LUNG (HCC): ICD-10-CM

## 2022-01-01 DIAGNOSIS — M12.812 ROTATOR CUFF ARTHROPATHY OF LEFT SHOULDER: Primary | ICD-10-CM

## 2022-01-01 DIAGNOSIS — C41.9 METASTATIC BONE CANCER (HCC): ICD-10-CM

## 2022-01-01 DIAGNOSIS — C79.51 NON-SMALL CELL LUNG CANCER METASTATIC TO BONE (HCC): Primary | ICD-10-CM

## 2022-01-01 DIAGNOSIS — E43 SEVERE PROTEIN-CALORIE MALNUTRITION (GOMEZ: LESS THAN 60% OF STANDARD WEIGHT) (HCC): ICD-10-CM

## 2022-01-01 DIAGNOSIS — R06.89 HYPERCAPNEMIA: ICD-10-CM

## 2022-01-01 DIAGNOSIS — R09.02 HYPOXIA: ICD-10-CM

## 2022-01-01 DIAGNOSIS — C80.1 CANCER (HCC): ICD-10-CM

## 2022-01-01 DIAGNOSIS — J90 PLEURAL EFFUSION: Primary | ICD-10-CM

## 2022-01-01 DIAGNOSIS — C34.90 NON-SMALL CELL LUNG CANCER METASTATIC TO BONE (HCC): ICD-10-CM

## 2022-01-01 DIAGNOSIS — C79.51 NON-SMALL CELL LUNG CANCER METASTATIC TO BONE (HCC): ICD-10-CM

## 2022-01-01 DIAGNOSIS — C34.90 NON-SMALL CELL LUNG CANCER METASTATIC TO BONE (HCC): Primary | ICD-10-CM

## 2022-01-01 DIAGNOSIS — E43 SEVERE PROTEIN-CALORIE MALNUTRITION (GOMEZ: LESS THAN 60% OF STANDARD WEIGHT) (HCC): Primary | ICD-10-CM

## 2022-01-01 DIAGNOSIS — F11.99 OPIOID USE, UNSPECIFIED WITH UNSPECIFIED OPIOID-INDUCED DISORDER (HCC): ICD-10-CM

## 2022-01-01 DIAGNOSIS — M25.512 LEFT SHOULDER PAIN, UNSPECIFIED CHRONICITY: ICD-10-CM

## 2022-01-01 LAB
ALBUMIN SERPL-MCNC: 2.5 G/DL (ref 3.5–5)
ALBUMIN SERPL-MCNC: 3 G/DL (ref 3.5–5)
ALBUMIN SERPL-MCNC: 3.2 G/DL (ref 3.5–5)
ALBUMIN SERPL-MCNC: 3.3 G/DL (ref 3.5–5)
ALBUMIN/GLOB SERPL: 0.9 {RATIO} (ref 1.1–2.2)
ALBUMIN/GLOB SERPL: 1 {RATIO} (ref 1.1–2.2)
ALBUMIN/GLOB SERPL: 1 {RATIO} (ref 1.1–2.2)
ALBUMIN/GLOB SERPL: 1.1 {RATIO} (ref 1.1–2.2)
ALP SERPL-CCNC: 149 U/L (ref 45–117)
ALP SERPL-CCNC: 179 U/L (ref 45–117)
ALP SERPL-CCNC: 196 U/L (ref 45–117)
ALP SERPL-CCNC: 252 U/L (ref 45–117)
ALT SERPL-CCNC: 20 U/L (ref 12–78)
ALT SERPL-CCNC: 22 U/L (ref 12–78)
ALT SERPL-CCNC: 26 U/L (ref 12–78)
ALT SERPL-CCNC: 26 U/L (ref 12–78)
ANION GAP SERPL CALC-SCNC: 4 MMOL/L (ref 5–15)
ANION GAP SERPL CALC-SCNC: 4 MMOL/L (ref 5–15)
ANION GAP SERPL CALC-SCNC: 7 MMOL/L (ref 5–15)
ANION GAP SERPL CALC-SCNC: 9 MMOL/L (ref 5–15)
ARTERIAL PATENCY WRIST A: ABNORMAL
ARTERIAL PATENCY WRIST A: YES
ARTERIAL PATENCY WRIST A: YES
AST SERPL-CCNC: 23 U/L (ref 15–37)
AST SERPL-CCNC: 26 U/L (ref 15–37)
AST SERPL-CCNC: 30 U/L (ref 15–37)
AST SERPL-CCNC: 32 U/L (ref 15–37)
ATRIAL RATE: 288 BPM
BASE EXCESS BLD CALC-SCNC: 3.1 MMOL/L
BASE EXCESS BLDA CALC-SCNC: 2.4 MMOL/L
BASE EXCESS BLDA CALC-SCNC: 3.8 MMOL/L
BASOPHILS # BLD: 0 K/UL (ref 0–0.1)
BASOPHILS NFR BLD: 0 % (ref 0–1)
BDY SITE: ABNORMAL
BILIRUB SERPL-MCNC: 0.5 MG/DL (ref 0.2–1)
BILIRUB SERPL-MCNC: 0.7 MG/DL (ref 0.2–1)
BNP SERPL-MCNC: 3312 PG/ML
BUN SERPL-MCNC: 20 MG/DL (ref 6–20)
BUN SERPL-MCNC: 26 MG/DL (ref 6–20)
BUN SERPL-MCNC: 27 MG/DL (ref 6–20)
BUN SERPL-MCNC: 36 MG/DL (ref 6–20)
BUN/CREAT SERPL: 22 (ref 12–20)
BUN/CREAT SERPL: 36 (ref 12–20)
BUN/CREAT SERPL: 36 (ref 12–20)
BUN/CREAT SERPL: 37 (ref 12–20)
CALCIUM SERPL-MCNC: 8.4 MG/DL (ref 8.5–10.1)
CALCIUM SERPL-MCNC: 8.5 MG/DL (ref 8.5–10.1)
CALCIUM SERPL-MCNC: 8.6 MG/DL (ref 8.5–10.1)
CALCIUM SERPL-MCNC: 9.2 MG/DL (ref 8.5–10.1)
CALCULATED R AXIS, ECG10: -12 DEGREES
CALCULATED T AXIS, ECG11: 78 DEGREES
CHLORIDE SERPL-SCNC: 95 MMOL/L (ref 97–108)
CHLORIDE SERPL-SCNC: 96 MMOL/L (ref 97–108)
CHLORIDE SERPL-SCNC: 96 MMOL/L (ref 97–108)
CHLORIDE SERPL-SCNC: 97 MMOL/L (ref 97–108)
CK SERPL-CCNC: 131 U/L (ref 39–308)
CO2 SERPL-SCNC: 31 MMOL/L (ref 21–32)
CO2 SERPL-SCNC: 32 MMOL/L (ref 21–32)
CO2 SERPL-SCNC: 33 MMOL/L (ref 21–32)
CO2 SERPL-SCNC: 33 MMOL/L (ref 21–32)
COVID-19 RAPID TEST, COVR: NOT DETECTED
CREAT SERPL-MCNC: 0.56 MG/DL (ref 0.7–1.3)
CREAT SERPL-MCNC: 0.71 MG/DL (ref 0.7–1.3)
CREAT SERPL-MCNC: 0.76 MG/DL (ref 0.7–1.3)
CREAT SERPL-MCNC: 1.67 MG/DL (ref 0.7–1.3)
DIAGNOSIS, 93000: NORMAL
DIFFERENTIAL METHOD BLD: ABNORMAL
ECHO AO ASC DIAM: 2.4 CM
ECHO AO ASCENDING AORTA INDEX: 1.45 CM/M2
ECHO AO ROOT DIAM: 2.8 CM
ECHO AO ROOT INDEX: 1.7 CM/M2
ECHO LA DIAMETER INDEX: 1.94 CM/M2
ECHO LA DIAMETER: 3.2 CM
ECHO LA TO AORTIC ROOT RATIO: 1.14
ECHO LV FRACTIONAL SHORTENING: 21 % (ref 28–44)
ECHO LV INTERNAL DIMENSION DIASTOLE INDEX: 2.36 CM/M2
ECHO LV INTERNAL DIMENSION DIASTOLIC: 3.9 CM (ref 4.2–5.9)
ECHO LV INTERNAL DIMENSION SYSTOLIC INDEX: 1.88 CM/M2
ECHO LV INTERNAL DIMENSION SYSTOLIC: 3.1 CM
ECHO LV IVSD: 1.1 CM (ref 0.6–1)
ECHO LV MASS 2D: 131 G (ref 88–224)
ECHO LV MASS INDEX 2D: 79.4 G/M2 (ref 49–115)
ECHO LV POSTERIOR WALL DIASTOLIC: 1 CM (ref 0.6–1)
ECHO LV RELATIVE WALL THICKNESS RATIO: 0.51
ECHO LVOT AREA: 2.8 CM2
ECHO LVOT DIAM: 1.9 CM
ECHO PV MAX VELOCITY: 0.9 M/S
ECHO PV PEAK GRADIENT: 3 MMHG
ECHO TV REGURGITANT MAX VELOCITY: 3.35 M/S
ECHO TV REGURGITANT PEAK GRADIENT: 45 MMHG
EOSINOPHIL # BLD: 0 K/UL (ref 0–0.4)
EOSINOPHIL NFR BLD: 0 % (ref 0–7)
ERYTHROCYTE [DISTWIDTH] IN BLOOD BY AUTOMATED COUNT: 16.2 % (ref 11.5–14.5)
ERYTHROCYTE [DISTWIDTH] IN BLOOD BY AUTOMATED COUNT: 16.5 % (ref 11.5–14.5)
ERYTHROCYTE [DISTWIDTH] IN BLOOD BY AUTOMATED COUNT: 16.9 % (ref 11.5–14.5)
ERYTHROCYTE [DISTWIDTH] IN BLOOD BY AUTOMATED COUNT: 17.1 % (ref 11.5–14.5)
FIO2 ON VENT: 30 %
GAS FLOW.O2 O2 DELIVERY SYS: ABNORMAL L/MIN
GLOBULIN SER CALC-MCNC: 2.9 G/DL (ref 2–4)
GLOBULIN SER CALC-MCNC: 2.9 G/DL (ref 2–4)
GLOBULIN SER CALC-MCNC: 3 G/DL (ref 2–4)
GLOBULIN SER CALC-MCNC: 3.2 G/DL (ref 2–4)
GLUCOSE SERPL-MCNC: 62 MG/DL (ref 65–100)
GLUCOSE SERPL-MCNC: 76 MG/DL (ref 65–100)
GLUCOSE SERPL-MCNC: 92 MG/DL (ref 65–100)
GLUCOSE SERPL-MCNC: 99 MG/DL (ref 65–100)
HCO3 BLD-SCNC: 31.5 MMOL/L (ref 22–26)
HCO3 BLDA-SCNC: 31 MMOL/L (ref 22–26)
HCO3 BLDA-SCNC: 35 MMOL/L (ref 22–26)
HCT VFR BLD AUTO: 39.9 % (ref 36.6–50.3)
HCT VFR BLD AUTO: 42 % (ref 36.6–50.3)
HCT VFR BLD AUTO: 43.7 % (ref 36.6–50.3)
HCT VFR BLD AUTO: 45.7 % (ref 36.6–50.3)
HGB BLD-MCNC: 13.1 G/DL (ref 12.1–17)
HGB BLD-MCNC: 13.2 G/DL (ref 12.1–17)
HGB BLD-MCNC: 13.8 G/DL (ref 12.1–17)
HGB BLD-MCNC: 14.2 G/DL (ref 12.1–17)
IMM GRANULOCYTES # BLD AUTO: 0.1 K/UL (ref 0–0.04)
IMM GRANULOCYTES # BLD AUTO: 0.2 K/UL (ref 0–0.04)
IMM GRANULOCYTES NFR BLD AUTO: 1 % (ref 0–0.5)
IPAP/PIP, IPAPIP: 13
LYMPHOCYTES # BLD: 0.3 K/UL (ref 0.8–3.5)
LYMPHOCYTES # BLD: 0.6 K/UL (ref 0.8–3.5)
LYMPHOCYTES # BLD: 0.8 K/UL (ref 0.8–3.5)
LYMPHOCYTES # BLD: 0.8 K/UL (ref 0.8–3.5)
LYMPHOCYTES NFR BLD: 2 % (ref 12–49)
LYMPHOCYTES NFR BLD: 5 % (ref 12–49)
LYMPHOCYTES NFR BLD: 7 % (ref 12–49)
LYMPHOCYTES NFR BLD: 7 % (ref 12–49)
MAGNESIUM SERPL-MCNC: 2 MG/DL (ref 1.6–2.4)
MCH RBC QN AUTO: 28.8 PG (ref 26–34)
MCH RBC QN AUTO: 29.1 PG (ref 26–34)
MCH RBC QN AUTO: 29.2 PG (ref 26–34)
MCH RBC QN AUTO: 29.2 PG (ref 26–34)
MCHC RBC AUTO-ENTMCNC: 31.1 G/DL (ref 30–36.5)
MCHC RBC AUTO-ENTMCNC: 31.4 G/DL (ref 30–36.5)
MCHC RBC AUTO-ENTMCNC: 31.6 G/DL (ref 30–36.5)
MCHC RBC AUTO-ENTMCNC: 32.8 G/DL (ref 30–36.5)
MCV RBC AUTO: 88.7 FL (ref 80–99)
MCV RBC AUTO: 91.7 FL (ref 80–99)
MCV RBC AUTO: 92.6 FL (ref 80–99)
MCV RBC AUTO: 93.8 FL (ref 80–99)
MONOCYTES # BLD: 0.8 K/UL (ref 0–1)
MONOCYTES # BLD: 0.9 K/UL (ref 0–1)
MONOCYTES # BLD: 1.5 K/UL (ref 0–1)
MONOCYTES # BLD: 1.6 K/UL (ref 0–1)
MONOCYTES NFR BLD: 10 % (ref 5–13)
MONOCYTES NFR BLD: 12 % (ref 5–13)
MONOCYTES NFR BLD: 7 % (ref 5–13)
MONOCYTES NFR BLD: 8 % (ref 5–13)
NEUTS SEG # BLD: 10.1 K/UL (ref 1.8–8)
NEUTS SEG # BLD: 13.7 K/UL (ref 1.8–8)
NEUTS SEG # BLD: 9.4 K/UL (ref 1.8–8)
NEUTS SEG # BLD: 9.8 K/UL (ref 1.8–8)
NEUTS SEG NFR BLD: 82 % (ref 32–75)
NEUTS SEG NFR BLD: 84 % (ref 32–75)
NEUTS SEG NFR BLD: 85 % (ref 32–75)
NEUTS SEG NFR BLD: 87 % (ref 32–75)
NRBC # BLD: 0 K/UL (ref 0–0.01)
NRBC BLD-RTO: 0 PER 100 WBC
O2/TOTAL GAS SETTING VFR VENT: 30 %
PCO2 BLD: 65.2 MMHG (ref 35–45)
PCO2 BLDA: 69 MMHG (ref 35–45)
PCO2 BLDA: 90 MMHG (ref 35–45)
PEEP RESPIRATORY: 5 CMH2O
PEEP RESPIRATORY: 6 CM[H2O]
PH BLD: 7.29 [PH] (ref 7.35–7.45)
PH BLDA: 7.21 [PH] (ref 7.35–7.45)
PH BLDA: 7.28 [PH] (ref 7.35–7.45)
PHOSPHATE SERPL-MCNC: 6 MG/DL (ref 2.6–4.7)
PIP ISTAT,IPIP: 15
PLATELET # BLD AUTO: 376 K/UL (ref 150–400)
PLATELET # BLD AUTO: 418 K/UL (ref 150–400)
PLATELET # BLD AUTO: 466 K/UL (ref 150–400)
PLATELET # BLD AUTO: 505 K/UL (ref 150–400)
PMV BLD AUTO: 9.1 FL (ref 8.9–12.9)
PMV BLD AUTO: 9.3 FL (ref 8.9–12.9)
PMV BLD AUTO: 9.5 FL (ref 8.9–12.9)
PMV BLD AUTO: 9.7 FL (ref 8.9–12.9)
PO2 BLD: 91 MMHG (ref 80–100)
PO2 BLDA: 106 MMHG (ref 80–100)
PO2 BLDA: 93 MMHG (ref 80–100)
POTASSIUM SERPL-SCNC: 3.8 MMOL/L (ref 3.5–5.1)
POTASSIUM SERPL-SCNC: 3.8 MMOL/L (ref 3.5–5.1)
POTASSIUM SERPL-SCNC: 4.2 MMOL/L (ref 3.5–5.1)
POTASSIUM SERPL-SCNC: 4.3 MMOL/L (ref 3.5–5.1)
PRESSURE SUPPORT SETTING VENT: 14 CMH2O
PROCALCITONIN SERPL-MCNC: <0.05 NG/ML
PROT SERPL-MCNC: 5.4 G/DL (ref 6.4–8.2)
PROT SERPL-MCNC: 5.9 G/DL (ref 6.4–8.2)
PROT SERPL-MCNC: 6.3 G/DL (ref 6.4–8.2)
PROT SERPL-MCNC: 6.4 G/DL (ref 6.4–8.2)
Q-T INTERVAL, ECG07: 394 MS
QRS DURATION, ECG06: 122 MS
QTC CALCULATION (BEZET), ECG08: 422 MS
RBC # BLD AUTO: 4.5 M/UL (ref 4.1–5.7)
RBC # BLD AUTO: 4.58 M/UL (ref 4.1–5.7)
RBC # BLD AUTO: 4.72 M/UL (ref 4.1–5.7)
RBC # BLD AUTO: 4.87 M/UL (ref 4.1–5.7)
RBC MORPH BLD: ABNORMAL
RBC MORPH BLD: ABNORMAL
SAO2 % BLD: 95.6 % (ref 92–97)
SAO2 % BLD: 96 % (ref 92–97)
SAO2 % BLD: 96 % (ref 92–97)
SAO2% DEVICE SAO2% SENSOR NAME: ABNORMAL
SAO2% DEVICE SAO2% SENSOR NAME: ABNORMAL
SERVICE CMNT-IMP: ABNORMAL
SODIUM SERPL-SCNC: 133 MMOL/L (ref 136–145)
SODIUM SERPL-SCNC: 134 MMOL/L (ref 136–145)
SODIUM SERPL-SCNC: 134 MMOL/L (ref 136–145)
SODIUM SERPL-SCNC: 136 MMOL/L (ref 136–145)
SOURCE, COVRS: NORMAL
SPECIMEN SITE: ABNORMAL
SPECIMEN SITE: ABNORMAL
SPECIMEN TYPE: ABNORMAL
TROPONIN-HIGH SENSITIVITY: 29 NG/L (ref 0–76)
VENTILATION MODE VENT: ABNORMAL
VENTRICULAR RATE, ECG03: 69 BPM
WBC # BLD AUTO: 11.2 K/UL (ref 4.1–11.1)
WBC # BLD AUTO: 11.8 K/UL (ref 4.1–11.1)
WBC # BLD AUTO: 12 K/UL (ref 4.1–11.1)
WBC # BLD AUTO: 15.8 K/UL (ref 4.1–11.1)

## 2022-01-01 PROCEDURE — 36415 COLL VENOUS BLD VENIPUNCTURE: CPT

## 2022-01-01 PROCEDURE — 1101F PT FALLS ASSESS-DOCD LE1/YR: CPT | Performed by: INTERNAL MEDICINE

## 2022-01-01 PROCEDURE — 1101F PT FALLS ASSESS-DOCD LE1/YR: CPT | Performed by: ORTHOPAEDIC SURGERY

## 2022-01-01 PROCEDURE — 80053 COMPREHEN METABOLIC PANEL: CPT

## 2022-01-01 PROCEDURE — 74011250636 HC RX REV CODE- 250/636: Performed by: INTERNAL MEDICINE

## 2022-01-01 PROCEDURE — 94761 N-INVAS EAR/PLS OXIMETRY MLT: CPT

## 2022-01-01 PROCEDURE — 82803 BLOOD GASES ANY COMBINATION: CPT

## 2022-01-01 PROCEDURE — 99205 OFFICE O/P NEW HI 60 MIN: CPT | Performed by: INTERNAL MEDICINE

## 2022-01-01 PROCEDURE — 70450 CT HEAD/BRAIN W/O DYE: CPT

## 2022-01-01 PROCEDURE — 2709999900 HC NON-CHARGEABLE SUPPLY

## 2022-01-01 PROCEDURE — G8536 NO DOC ELDER MAL SCRN: HCPCS | Performed by: INTERNAL MEDICINE

## 2022-01-01 PROCEDURE — 93005 ELECTROCARDIOGRAM TRACING: CPT

## 2022-01-01 PROCEDURE — 82550 ASSAY OF CK (CPK): CPT

## 2022-01-01 PROCEDURE — 93306 TTE W/DOPPLER COMPLETE: CPT

## 2022-01-01 PROCEDURE — 5A09457 ASSISTANCE WITH RESPIRATORY VENTILATION, 24-96 CONSECUTIVE HOURS, CONTINUOUS POSITIVE AIRWAY PRESSURE: ICD-10-PCS | Performed by: EMERGENCY MEDICINE

## 2022-01-01 PROCEDURE — 74011250637 HC RX REV CODE- 250/637: Performed by: INTERNAL MEDICINE

## 2022-01-01 PROCEDURE — 73564 X-RAY EXAM KNEE 4 OR MORE: CPT

## 2022-01-01 PROCEDURE — 85025 COMPLETE CBC W/AUTO DIFF WBC: CPT

## 2022-01-01 PROCEDURE — 65660000000 HC RM CCU STEPDOWN

## 2022-01-01 PROCEDURE — G8420 CALC BMI NORM PARAMETERS: HCPCS | Performed by: ORTHOPAEDIC SURGERY

## 2022-01-01 PROCEDURE — 20610 DRAIN/INJ JOINT/BURSA W/O US: CPT | Performed by: ORTHOPAEDIC SURGERY

## 2022-01-01 PROCEDURE — 94640 AIRWAY INHALATION TREATMENT: CPT

## 2022-01-01 PROCEDURE — 74011000250 HC RX REV CODE- 250: Performed by: INTERNAL MEDICINE

## 2022-01-01 PROCEDURE — 99204 OFFICE O/P NEW MOD 45 MIN: CPT | Performed by: ORTHOPAEDIC SURGERY

## 2022-01-01 PROCEDURE — 99233 SBSQ HOSP IP/OBS HIGH 50: CPT | Performed by: INTERNAL MEDICINE

## 2022-01-01 PROCEDURE — 74011250636 HC RX REV CODE- 250/636: Performed by: STUDENT IN AN ORGANIZED HEALTH CARE EDUCATION/TRAINING PROGRAM

## 2022-01-01 PROCEDURE — 77010033678 HC OXYGEN DAILY

## 2022-01-01 PROCEDURE — 74011000258 HC RX REV CODE- 258: Performed by: INTERNAL MEDICINE

## 2022-01-01 PROCEDURE — 71045 X-RAY EXAM CHEST 1 VIEW: CPT

## 2022-01-01 PROCEDURE — 84484 ASSAY OF TROPONIN QUANT: CPT

## 2022-01-01 PROCEDURE — 36600 WITHDRAWAL OF ARTERIAL BLOOD: CPT

## 2022-01-01 PROCEDURE — 99233 SBSQ HOSP IP/OBS HIGH 50: CPT | Performed by: NURSE PRACTITIONER

## 2022-01-01 PROCEDURE — 74011250636 HC RX REV CODE- 250/636: Performed by: EMERGENCY MEDICINE

## 2022-01-01 PROCEDURE — 99441 PR PHYS/QHP TELEPHONE EVALUATION 5-10 MIN: CPT | Performed by: ORTHOPAEDIC SURGERY

## 2022-01-01 PROCEDURE — 87635 SARS-COV-2 COVID-19 AMP PRB: CPT

## 2022-01-01 PROCEDURE — 83880 ASSAY OF NATRIURETIC PEPTIDE: CPT

## 2022-01-01 PROCEDURE — 99285 EMERGENCY DEPT VISIT HI MDM: CPT

## 2022-01-01 PROCEDURE — G8510 SCR DEP NEG, NO PLAN REQD: HCPCS | Performed by: ORTHOPAEDIC SURGERY

## 2022-01-01 PROCEDURE — G0463 HOSPITAL OUTPT CLINIC VISIT: HCPCS | Performed by: INTERNAL MEDICINE

## 2022-01-01 PROCEDURE — 92610 EVALUATE SWALLOWING FUNCTION: CPT

## 2022-01-01 PROCEDURE — G8432 DEP SCR NOT DOC, RNG: HCPCS | Performed by: INTERNAL MEDICINE

## 2022-01-01 PROCEDURE — 84145 PROCALCITONIN (PCT): CPT

## 2022-01-01 PROCEDURE — 51798 US URINE CAPACITY MEASURE: CPT

## 2022-01-01 PROCEDURE — 94660 CPAP INITIATION&MGMT: CPT

## 2022-01-01 PROCEDURE — 99222 1ST HOSP IP/OBS MODERATE 55: CPT | Performed by: NURSE PRACTITIONER

## 2022-01-01 PROCEDURE — 74011250636 HC RX REV CODE- 250/636: Performed by: NURSE PRACTITIONER

## 2022-01-01 PROCEDURE — 84100 ASSAY OF PHOSPHORUS: CPT

## 2022-01-01 PROCEDURE — G8420 CALC BMI NORM PARAMETERS: HCPCS | Performed by: INTERNAL MEDICINE

## 2022-01-01 PROCEDURE — 73030 X-RAY EXAM OF SHOULDER: CPT

## 2022-01-01 PROCEDURE — G8536 NO DOC ELDER MAL SCRN: HCPCS | Performed by: ORTHOPAEDIC SURGERY

## 2022-01-01 PROCEDURE — 97140 MANUAL THERAPY 1/> REGIONS: CPT

## 2022-01-01 PROCEDURE — G8427 DOCREV CUR MEDS BY ELIG CLIN: HCPCS | Performed by: ORTHOPAEDIC SURGERY

## 2022-01-01 PROCEDURE — 83735 ASSAY OF MAGNESIUM: CPT

## 2022-01-01 PROCEDURE — G8427 DOCREV CUR MEDS BY ELIG CLIN: HCPCS | Performed by: INTERNAL MEDICINE

## 2022-01-01 PROCEDURE — 71250 CT THORAX DX C-: CPT

## 2022-01-01 RX ORDER — MORPHINE SULFATE 2 MG/ML
1 INJECTION, SOLUTION INTRAMUSCULAR; INTRAVENOUS
Status: DISCONTINUED | OUTPATIENT
Start: 2022-01-01 | End: 2022-01-01 | Stop reason: HOSPADM

## 2022-01-01 RX ORDER — LORAZEPAM 2 MG/ML
0.5 INJECTION INTRAMUSCULAR ONCE
Status: COMPLETED | OUTPATIENT
Start: 2022-01-01 | End: 2022-01-01

## 2022-01-01 RX ORDER — FUROSEMIDE 10 MG/ML
20 INJECTION INTRAMUSCULAR; INTRAVENOUS ONCE
Status: COMPLETED | OUTPATIENT
Start: 2022-01-01 | End: 2022-01-01

## 2022-01-01 RX ORDER — HALOPERIDOL 5 MG/ML
1 INJECTION INTRAMUSCULAR ONCE
Status: COMPLETED | OUTPATIENT
Start: 2022-01-01 | End: 2022-01-01

## 2022-01-01 RX ORDER — IPRATROPIUM BROMIDE AND ALBUTEROL SULFATE 2.5; .5 MG/3ML; MG/3ML
3 SOLUTION RESPIRATORY (INHALATION)
Status: DISCONTINUED | OUTPATIENT
Start: 2022-01-01 | End: 2022-01-01

## 2022-01-01 RX ORDER — FUROSEMIDE 10 MG/ML
40 INJECTION INTRAMUSCULAR; INTRAVENOUS 2 TIMES DAILY
Status: DISCONTINUED | OUTPATIENT
Start: 2022-01-01 | End: 2022-01-01 | Stop reason: HOSPADM

## 2022-01-01 RX ORDER — SODIUM CHLORIDE 0.9 % (FLUSH) 0.9 %
5-40 SYRINGE (ML) INJECTION EVERY 8 HOURS
Status: DISCONTINUED | OUTPATIENT
Start: 2022-01-01 | End: 2022-01-01

## 2022-01-01 RX ORDER — SODIUM CHLORIDE 0.9 % (FLUSH) 0.9 %
5-40 SYRINGE (ML) INJECTION AS NEEDED
Status: DISCONTINUED | OUTPATIENT
Start: 2022-01-01 | End: 2022-01-01 | Stop reason: HOSPADM

## 2022-01-01 RX ORDER — ACETAMINOPHEN 325 MG/1
650 TABLET ORAL
Status: DISCONTINUED | OUTPATIENT
Start: 2022-01-01 | End: 2022-01-01

## 2022-01-01 RX ORDER — ENOXAPARIN SODIUM 100 MG/ML
40 INJECTION SUBCUTANEOUS DAILY
Status: DISCONTINUED | OUTPATIENT
Start: 2022-01-01 | End: 2022-01-01

## 2022-01-01 RX ORDER — PROMETHAZINE HYDROCHLORIDE 25 MG/1
12.5 TABLET ORAL
Status: DISCONTINUED | OUTPATIENT
Start: 2022-01-01 | End: 2022-01-01

## 2022-01-01 RX ORDER — BALSAM PERU/CASTOR OIL
OINTMENT (GRAM) TOPICAL 2 TIMES DAILY
Status: DISCONTINUED | OUTPATIENT
Start: 2022-01-01 | End: 2022-01-01 | Stop reason: HOSPADM

## 2022-01-01 RX ORDER — HALOPERIDOL 5 MG/ML
1 INJECTION INTRAMUSCULAR
Status: DISCONTINUED | OUTPATIENT
Start: 2022-01-01 | End: 2022-01-01 | Stop reason: HOSPADM

## 2022-01-01 RX ORDER — ACETAMINOPHEN 650 MG/1
650 SUPPOSITORY RECTAL
Status: DISCONTINUED | OUTPATIENT
Start: 2022-01-01 | End: 2022-01-01

## 2022-01-01 RX ORDER — ONDANSETRON 2 MG/ML
4 INJECTION INTRAMUSCULAR; INTRAVENOUS
Status: DISCONTINUED | OUTPATIENT
Start: 2022-01-01 | End: 2022-01-01

## 2022-01-01 RX ORDER — BETAMETHASONE SODIUM PHOSPHATE AND BETAMETHASONE ACETATE 3; 3 MG/ML; MG/ML
6 INJECTION, SUSPENSION INTRA-ARTICULAR; INTRALESIONAL; INTRAMUSCULAR; SOFT TISSUE ONCE
Status: COMPLETED | OUTPATIENT
Start: 2022-01-01 | End: 2022-01-01

## 2022-01-01 RX ORDER — PANTOPRAZOLE SODIUM 40 MG/1
40 TABLET, DELAYED RELEASE ORAL
Status: DISCONTINUED | OUTPATIENT
Start: 2022-01-01 | End: 2022-01-01

## 2022-01-01 RX ORDER — ALFUZOSIN HYDROCHLORIDE 10 MG/1
TABLET, EXTENDED RELEASE ORAL
COMMUNITY
Start: 2021-01-01

## 2022-01-01 RX ORDER — OXYCODONE HYDROCHLORIDE 5 MG/1
5 TABLET ORAL
COMMUNITY

## 2022-01-01 RX ORDER — OXYCODONE HYDROCHLORIDE 5 MG/1
5 TABLET ORAL
Qty: 42 TABLET | Refills: 0 | Status: SHIPPED | OUTPATIENT
Start: 2022-01-01 | End: 2022-01-01

## 2022-01-01 RX ORDER — BISACODYL 5 MG
5 TABLET, DELAYED RELEASE (ENTERIC COATED) ORAL DAILY PRN
Status: DISCONTINUED | OUTPATIENT
Start: 2022-01-01 | End: 2022-01-01

## 2022-01-01 RX ORDER — GUAIFENESIN 100 MG/5ML
81 LIQUID (ML) ORAL DAILY
Status: DISCONTINUED | OUTPATIENT
Start: 2022-01-01 | End: 2022-01-01

## 2022-01-01 RX ORDER — LORAZEPAM 2 MG/ML
1 INJECTION INTRAMUSCULAR ONCE
Status: DISCONTINUED | OUTPATIENT
Start: 2022-01-01 | End: 2022-01-01

## 2022-01-01 RX ORDER — GLYCOPYRROLATE 0.2 MG/ML
0.1 INJECTION INTRAMUSCULAR; INTRAVENOUS
Status: DISCONTINUED | OUTPATIENT
Start: 2022-01-01 | End: 2022-01-01 | Stop reason: HOSPADM

## 2022-01-01 RX ORDER — MORPHINE SULFATE 2 MG/ML
1 INJECTION, SOLUTION INTRAMUSCULAR; INTRAVENOUS
Status: DISCONTINUED | OUTPATIENT
Start: 2022-01-01 | End: 2022-01-01

## 2022-01-01 RX ORDER — POLYETHYLENE GLYCOL 3350 17 G/17G
17 POWDER, FOR SOLUTION ORAL DAILY
Status: DISCONTINUED | OUTPATIENT
Start: 2022-01-01 | End: 2022-01-01

## 2022-01-01 RX ADMIN — SODIUM CHLORIDE, PRESERVATIVE FREE 10 ML: 5 INJECTION INTRAVENOUS at 21:07

## 2022-01-01 RX ADMIN — ENOXAPARIN SODIUM 40 MG: 100 INJECTION SUBCUTANEOUS at 08:45

## 2022-01-01 RX ADMIN — SODIUM CHLORIDE, PRESERVATIVE FREE 10 ML: 5 INJECTION INTRAVENOUS at 21:36

## 2022-01-01 RX ADMIN — PIPERACILLIN AND TAZOBACTAM 3.38 G: 3; .375 INJECTION, POWDER, LYOPHILIZED, FOR SOLUTION INTRAVENOUS at 09:44

## 2022-01-01 RX ADMIN — PIPERACILLIN AND TAZOBACTAM 3.38 G: 3; .375 INJECTION, POWDER, LYOPHILIZED, FOR SOLUTION INTRAVENOUS at 19:41

## 2022-01-01 RX ADMIN — SODIUM CHLORIDE, PRESERVATIVE FREE 10 ML: 5 INJECTION INTRAVENOUS at 06:08

## 2022-01-01 RX ADMIN — CASTOR OIL AND BALSAM, PERU: 788; 87 OINTMENT TOPICAL at 17:05

## 2022-01-01 RX ADMIN — LORAZEPAM 0.5 MG: 2 INJECTION INTRAMUSCULAR; INTRAVENOUS at 01:35

## 2022-01-01 RX ADMIN — BETAMETHASONE SODIUM PHOSPHATE AND BETAMETHASONE ACETATE 6 MG: 3; 3 INJECTION, SUSPENSION INTRA-ARTICULAR; INTRALESIONAL; INTRAMUSCULAR; SOFT TISSUE at 14:33

## 2022-01-01 RX ADMIN — IPRATROPIUM BROMIDE AND ALBUTEROL SULFATE 3 ML: .5; 3 SOLUTION RESPIRATORY (INHALATION) at 01:28

## 2022-01-01 RX ADMIN — PIPERACILLIN AND TAZOBACTAM 3.38 G: 3; .375 INJECTION, POWDER, LYOPHILIZED, FOR SOLUTION INTRAVENOUS at 17:05

## 2022-01-01 RX ADMIN — ENOXAPARIN SODIUM 40 MG: 100 INJECTION SUBCUTANEOUS at 09:45

## 2022-01-01 RX ADMIN — PIPERACILLIN AND TAZOBACTAM 3.38 G: 3; .375 INJECTION, POWDER, LYOPHILIZED, FOR SOLUTION INTRAVENOUS at 17:01

## 2022-01-01 RX ADMIN — CASTOR OIL AND BALSAM, PERU: 788; 87 OINTMENT TOPICAL at 17:08

## 2022-01-01 RX ADMIN — HALOPERIDOL LACTATE 1 MG: 5 INJECTION, SOLUTION INTRAMUSCULAR at 17:36

## 2022-01-01 RX ADMIN — ENOXAPARIN SODIUM 40 MG: 100 INJECTION SUBCUTANEOUS at 09:47

## 2022-01-01 RX ADMIN — SODIUM CHLORIDE, PRESERVATIVE FREE 10 ML: 5 INJECTION INTRAVENOUS at 05:47

## 2022-01-01 RX ADMIN — MORPHINE SULFATE 1 MG: 2 INJECTION, SOLUTION INTRAMUSCULAR; INTRAVENOUS at 13:42

## 2022-01-01 RX ADMIN — SODIUM CHLORIDE, PRESERVATIVE FREE 10 ML: 5 INJECTION INTRAVENOUS at 13:49

## 2022-01-01 RX ADMIN — IPRATROPIUM BROMIDE AND ALBUTEROL SULFATE 3 ML: .5; 3 SOLUTION RESPIRATORY (INHALATION) at 14:26

## 2022-01-01 RX ADMIN — PIPERACILLIN AND TAZOBACTAM 3.38 G: 3; .375 INJECTION, POWDER, LYOPHILIZED, FOR SOLUTION INTRAVENOUS at 09:54

## 2022-01-01 RX ADMIN — IPRATROPIUM BROMIDE AND ALBUTEROL SULFATE 3 ML: .5; 3 SOLUTION RESPIRATORY (INHALATION) at 19:40

## 2022-01-01 RX ADMIN — SODIUM CHLORIDE, PRESERVATIVE FREE 10 ML: 5 INJECTION INTRAVENOUS at 12:53

## 2022-01-01 RX ADMIN — CASTOR OIL AND BALSAM, PERU: 788; 87 OINTMENT TOPICAL at 09:48

## 2022-01-01 RX ADMIN — PIPERACILLIN AND TAZOBACTAM 3.38 G: 3; .375 INJECTION, POWDER, LYOPHILIZED, FOR SOLUTION INTRAVENOUS at 09:53

## 2022-01-01 RX ADMIN — IPRATROPIUM BROMIDE AND ALBUTEROL SULFATE 3 ML: .5; 3 SOLUTION RESPIRATORY (INHALATION) at 08:46

## 2022-01-01 RX ADMIN — FUROSEMIDE 40 MG: 10 INJECTION, SOLUTION INTRAMUSCULAR; INTRAVENOUS at 08:45

## 2022-01-01 RX ADMIN — HALOPERIDOL LACTATE 1 MG: 5 INJECTION, SOLUTION INTRAMUSCULAR at 19:11

## 2022-01-01 RX ADMIN — PIPERACILLIN AND TAZOBACTAM 3.38 G: 3; .375 INJECTION, POWDER, LYOPHILIZED, FOR SOLUTION INTRAVENOUS at 00:57

## 2022-01-01 RX ADMIN — FUROSEMIDE 40 MG: 10 INJECTION, SOLUTION INTRAMUSCULAR; INTRAVENOUS at 17:01

## 2022-01-01 RX ADMIN — SODIUM CHLORIDE, PRESERVATIVE FREE 10 ML: 5 INJECTION INTRAVENOUS at 22:03

## 2022-01-01 RX ADMIN — FUROSEMIDE 40 MG: 10 INJECTION, SOLUTION INTRAMUSCULAR; INTRAVENOUS at 17:05

## 2022-01-01 RX ADMIN — CASTOR OIL AND BALSAM, PERU: 788; 87 OINTMENT TOPICAL at 09:53

## 2022-01-01 RX ADMIN — FUROSEMIDE 20 MG: 10 INJECTION, SOLUTION INTRAMUSCULAR; INTRAVENOUS at 17:29

## 2022-01-01 RX ADMIN — HALOPERIDOL LACTATE 1 MG: 5 INJECTION, SOLUTION INTRAMUSCULAR at 04:39

## 2022-01-01 RX ADMIN — SODIUM CHLORIDE, PRESERVATIVE FREE 10 ML: 5 INJECTION INTRAVENOUS at 05:31

## 2022-01-01 RX ADMIN — LORAZEPAM 0.5 MG: 2 INJECTION INTRAMUSCULAR; INTRAVENOUS at 19:47

## 2022-01-01 RX ADMIN — IPRATROPIUM BROMIDE AND ALBUTEROL SULFATE 3 ML: .5; 3 SOLUTION RESPIRATORY (INHALATION) at 20:20

## 2022-01-01 RX ADMIN — CASTOR OIL AND BALSAM, PERU: 788; 87 OINTMENT TOPICAL at 09:20

## 2022-01-01 RX ADMIN — PIPERACILLIN AND TAZOBACTAM 3.38 G: 3; .375 INJECTION, POWDER, LYOPHILIZED, FOR SOLUTION INTRAVENOUS at 01:21

## 2022-01-01 RX ADMIN — HALOPERIDOL LACTATE 1 MG: 5 INJECTION, SOLUTION INTRAMUSCULAR at 12:48

## 2022-01-01 RX ADMIN — PIPERACILLIN AND TAZOBACTAM 3.38 G: 3; .375 INJECTION, POWDER, LYOPHILIZED, FOR SOLUTION INTRAVENOUS at 01:20

## 2022-01-01 RX ADMIN — FUROSEMIDE 40 MG: 10 INJECTION, SOLUTION INTRAMUSCULAR; INTRAVENOUS at 11:43

## 2022-01-01 RX ADMIN — FUROSEMIDE 40 MG: 10 INJECTION, SOLUTION INTRAMUSCULAR; INTRAVENOUS at 19:41

## 2022-01-01 RX ADMIN — FUROSEMIDE 40 MG: 10 INJECTION, SOLUTION INTRAMUSCULAR; INTRAVENOUS at 07:32

## 2022-01-04 PROBLEM — F11.99 OPIOID USE, UNSPECIFIED WITH UNSPECIFIED OPIOID-INDUCED DISORDER (HCC): Status: ACTIVE | Noted: 2022-01-01

## 2022-01-04 NOTE — PROGRESS NOTES
Identified pt with two pt identifiers (name and ). Reviewed chart in preparation for visit and have obtained necessary documentation. Ankita Chino is a 80 y.o. male  Chief Complaint   Patient presents with    Shoulder Pain     LT Shoulder    Knee Pain     RT Knee     Visit Vitals  BP (!) 146/82 (BP 1 Location: Right arm, BP Patient Position: Sitting, BP Cuff Size: Adult)   Pulse (!) 55   Temp 97.2 °F (36.2 °C) (Tympanic)   Ht 5' 10\" (1.778 m)   Wt 133 lb (60.3 kg)   SpO2 94%   BMI 19.08 kg/m²     1. Have you been to the ER, urgent care clinic since your last visit? Hospitalized since your last visit? No    2. Have you seen or consulted any other health care providers outside of the [address] [place] [address] since your last visit? Include any pap smears or colon screening.  No

## 2022-01-04 NOTE — LETTER
1/4/2022    Patient: Wood Lennon   YOB: 1929   Date of Visit: 1/4/2022     Conrad Harley MD  [address] [facility] [address]  [address] [address]  [place]    Dear Conrad Harley MD,      Thank you for referring Mr. Wood Lennon to [facility] for evaluation. My notes for this consultation are attached. If you have questions, please do not hesitate to call me. I look forward to following your patient along with you.       Sincerely,    Zohreh Plata, DO

## 2022-01-04 NOTE — PROGRESS NOTES
2022      CC: left shoulder pain, right knee pain    HPI:      This is a 80y.o. year old male who has a complex history of stage four lung cancer, he also has a history of left rotator cuff repair 30 years ago, since failed him, he has significant disability. He also has a history of a rupture of his right quadriceps tendon which was repaired and failed, he states he has had a week of significant swelling in his bilateral legs as well as his left shoulder. No trauma with this. He denies any other musculoskeletal complaints. He is nonambulatory due to his quadriceps rupture. Right-hand-dominant. PMH:  Past Medical History:   Diagnosis Date    Cancer ([facility] [address]     Stage 4 Lung Cancer    GERD (gastroesophageal reflux disease)     Lymphedema     Urinary incontinence        PSxHx:  Past Surgical History:   Procedure Laterality Date    HX ORTHOPAEDIC Left     Achilles     HX ORTHOPAEDIC Right     knee surery    HX ROTATOR CUFF REPAIR Left        Meds:    Current Outpatient Medications:     oxyCODONE IR (ROXICODONE) 5 mg immediate release tablet, Take 1 Tablet by mouth every four (4) hours as needed for Pain for up to 7 days. Max Daily Amount: 30 mg., Disp: 42 Tablet, Rfl: 0    senna-docusate (Vegetable Lax-Stool Softener) 8.6-50 mg per tablet, Take 1 Tablet by mouth daily. , Disp: , Rfl:     acetaminophen/diphenhydramine (TYLENOL PM PO), Take 500 mg by mouth daily. At bedtime, Disp: , Rfl:     esomeprazole magnesium (NEXIUM 24HR PO), Take  by mouth., Disp: , Rfl:   No current facility-administered medications for this visit.     All:  No Known Allergies    Social Hx:  Social History     Socioeconomic History    Marital status:    Tobacco Use    Smoking status: Former Smoker     Quit date: 10/5/1991     Years since quittin.2    Smokeless tobacco: Never Used   Vaping Use    Vaping Use: Never used   Substance and Sexual Activity    Alcohol use: Not Currently    Drug use: Never    Sexual activity: Not Currently       Family Hx:  No family history on file. Review of Systems:       General: Denies headache, lethargy, fever, weight loss  Ears/Nose/Throat: Denies ear discharge, drainage, nosebleeds, hoarse voice, dental problems  Cardiovascular: Denies chest pain, shortness of breath  Lungs: Denies chest pain, breathing problems, wheezing, pneumonia  Stomach: Denies stomach pain, heartburn, constipation, irritable bowel  Skin: Denies rash, sores, open wounds  Musculoskeletal: Left shoulder pain, right knee pain, ambulatory disturbance  Genitourinary: Denies dysuria, hematuria, polyuria  Gastrointestinal: Denies constipation, obstipation, diarrhea  Neurological: Denies changes in sight, smell, hearing, taste, seizures. Denies loss of consciousness. Psychiatric: Denies depression, sleep pattern changes, anxiety, change in personality  Endocrine: Denies mood swings, heat or cold intolerance  Hematologic/Lymphatic: Denies anemia, purpura, petechia  Allergic/Immunologic: Denies swelling of throat, pain or swelling at lymph nodes      Physical Examination:    Visit Vitals  BP (!) 146/82 (BP 1 Location: Right arm, BP Patient Position: Sitting, BP Cuff Size: Adult)   Pulse (!) 55   Temp 97.2 °F (36.2 °C) (Tympanic)   Ht 5' 10\" (1.778 m)   Wt 133 lb (60.3 kg)   SpO2 94%   BMI 19.08 kg/m²        General: AOX3, no apparent distress  Psychiatric: mood and affect appropriate  Lungs: breathing is symmetric and unlabored bilaterally  Heart: regular rate and rhythm  Abdomen: no guarding  Head: normocephalic, atraumatic  Skin: No significant abnormalities, good turgor  Sensation intact to light touch: C5-T1 dermatomes  Muscular exam: 5/5 strength in all major muscle groups unless noted in specialty exam.    Extremities      Right upper extremity: Extremity is examined, no evidence of gross deformity, no gross motor or sensory deficit. Full active and passive range of motion is noted.   Muscle tone and bulk is within normal expected limits. Capillary refill is less than 2 seconds distally. Left upper extremity: Large effusion is noted, significant swelling, restricted range of motion. Difficult to assess strength due to his restriction in motion. Sensation intact light touch in C5-T1 dermatomes. Capillary refills less than 2 seconds distally. Right lower extremity: Palpable gap at the proximal pole of the patella.  0 out of 5 knee extension strength. 2+ pitting edema distally. Sensation intact light touch in L1-S1 dermatomes. Left lower extremity: Extremity is examined, 2+ pitting edema at the mid shin, no evidence of gross deformity, no gross motor or sensory deficit. Full active and passive range of motion is noted. Muscle tone and bulk is within normal expected limits. Capillary refill is less than 2 seconds distally. Diagnostics:    Pertinent Diagnostics: X-rays are ordered and reviewed by myself of the left shoulder and right knee, these indicate severe rotator cuff arthropathy of the left shoulder, 2 metallic bone anchors are noted in the humeral head, proximal humerus does have a circumscribed sclerotic lesion in the left proximal humerus which is concerning for metastatic bone disease. Right knee demonstrates patella baja indicative of quadriceps rupture, degenerative changes are noted throughout the right knee. 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 Rotator cuff arthropathy, left. Osteoarthritis with chronic quadriceps rupture, right knee. Plan: This patient has a complex issue, he does have likely recurrence of his cancer, additionally, he does have a severe rotator cuff arthropathy and a large effusion in the left shoulder, he is elected for aspiration and injection, he does have chronic pain and I will give him a one-time prescription of oxycodone, but I will prescribe for him pain management.   Due to his likely metastatic lesion, I put in for him to see him to oncology as well, additionally, he will follow-up with me after 1 week for repeat clinical check. PROCEDURE NOTE :    Consent was obtained from the patient. The correct site was identified after confirmation with the patient. Following identification and confirmation of the correct site with the patient, the area was prepped in the normal sterile fashion. Once sterile, I aspirated 65 cc of clear synovial fluid, followed by an injection of a mixture of 6 mg of betamethasone and 1% lidocaine without epinephrine was administered to the left shoulder subacromial space. The needle was then withdrawn and the injection site dressed with a sterile bandage at the conclusion. The procedure was well tolerated by the patient. No immediate adverse reactions were noted. Post injection instructions were given. Mr. Traci Isbell has a reminder for a \"due or due soon\" health maintenance. I have asked that he contact his primary care provider for follow-up on this health maintenance.

## 2022-01-07 NOTE — PROGRESS NOTES
LYMPHEDEMA PT DAILY TREATMENT NOTE - [facility] 2-15  [address]  Patient Name: Daniella Cashing  Date:2022  : 1929  [x]  Patient  Verified  Payor: [place] MEDICARE / Plan: [place] MEDICARE PART A & B / Product Type: Medicare /    In time: 12:20 pm Out time: 1:15 pm  Total Treatment Time (min): 50  Total Timed Codes (min): 50  1:1 Treatment Time (MC only): 50  Visit #:  2    Treatment Area: Lymphedema, not elsewhere classified [I89.0]    SUBJECTIVE  Pain Level (0-10 scale): 0/10  Any medication changes, allergies to medications, adverse drug reactions, diagnosis change, or new procedure performed?: [x] No    [] Yes (see summary sheet for update)  Subjective functional status/changes:   [x] No changes reported  Patient arrived today with his daughter for follow up and to have his compression wraps fitted. OBJECTIVE  50 min Manual Therapy  Manual Lymphatic Drainage (MLD):  Area to decongest: LE: bilateral   Sequence used and effectiveness: Secondary sequence focus on MLD with skin care. Skin/wound care/debridement: Reviewed skin care principles:   Hygiene  Prevention of cellulitis   Applied multi-layer compression bandaging to: Deferred. Patient in  [address] Patient has been bandaged in the past. Patient's daughter made aware that there are some agencies that would come to facilities to bandage if this becomes needed. Upper/Lower Extremity Compression: Fitted for the following compression products:    LE: bilateral bilateral lower extremity: Knee high    Style: Velcro    Brand: Sigvaris    Type: Non-Custom: Size: Medium regular length (2) with extender strap  and large compression liner socks (CircAid Compresive Undersocks 25-35 mmHg)     Vendor: [employer]    Education: Educated patient in compression garment donning and doffing. Glove use with donning. Daily wear schedule. Daily laundering. Garment lifespan.   Return and reordering process (by bringing prior garments into the clinic). Educated patient to monitor for redness or pressure points on the skin. If new pain occurs they should contact their therapist.  Instructions for staff donning and doffing given to daughter to take to facility and the clinic will also send information by fax to the facility as well. Patient/family demonstrated donning and doffing with independence and verbal cues     Rationale: decrease edema  to improve the patients ability to prevent worsening of swelling over time. With   [] TE   [] TA   [x] MT   [] SC   [] other: Patient Education: [x] Review HEP    [] MLD Patient Education Continued education in self MLD technique with bathing and skin care. [x] Progressed/Changed HEP based on:   [x] positioning   [] Kinesiotape   [x] Skin care   [] wound care   [x] other: Compression garment use/Hand out given. Compression bandaging/garment precautions reviewed: [x] Yes  [] No     Other Objective/Functional Measures:   Deferred today. Will reassess next visit to assess garment effectiveness. Pain Level (0-10 scale) post treatment:0/10    ASSESSMENT/Changes in Function:   Patient able to return today to have his compression garments fitted that were ordered in October. Patient is comfortable in the products, but his daughter has some concerns about how consistently they will be used in the facility as patient is in Assisted Living. Patient will need to have an MD order to have assistance with garment use as they need to be worn as tolerated from when he wakes up until evening (up to 12 hours) with skin checks as needed. Patient's daughter will work with the patient to see if he is able to adjust and assist with donning and doffing velcro products himself. Patient will need assistance with the compressive liner sock as they are more difficult to don.  Patient's daughter aware to make an appointment for 3 weeks to follow up to see how the staff is managing the products and assess reduction in swelling. Daughter aware to contact the clinic with any questions she may have before returning with patient in 3 weeks. Patient will continue to benefit from skilled PT services to  address swelling, analyze compression product fit and use, instruct in home lymphedema management program and modify and progress therapeutic interventions to attain remaining goals. Progress towards goals / Updated goals:  GOALS  Short term goals  Time frame: to be met by 11/24/21  1. Patient will demonstrate knowledge of signs/symptoms of infections/cellulitis and be independent in skin care to prevent cellulitis. Extend goal as this was just his 2nd visit. 2.  Patient will demonstrate independence in lymphedema home program of therapeutic exercises to improve circulation and decongest limb to improve ADLs. Extend goal as this was just his 2nd visit. 3.  Patient will tolerate multi-layer bandages (MLB) and show measureable decrease in limb volume and/or participate in the selection process to allow ordering of home compression system (daytime, nighttime garments and pump as needed). Patient fitted with his compression garments today. Will extend goal as patient will benefit from additional liner socks and also extender straps so vendor will be made aware. Long term goals  Time frame: to be met by 1/15/21  1. Pt will show improvement in the Lymphedema Life Impact Scale (LLIS) by decreasing the score to 8 and thus allow improvement in patient's quality of life. Extend goal.   2.  Patient will be independent with don/doff of compression system and use in order to prevent reaccumulation of fluid at discharge. Daughter able to demonstrate that she can don/doff products. Will extend goal to assess how they manage in the facility. 3.  Pt will be independent in self-MLD and show stable limb volumes showing decongestion and pt. ready for transition to independent restorative phase of lymphedema therapy.  Will extend goal to assess after garments have been in place for 3 weeks. PLAN  []  Upgrade activities as tolerated     [x]  Extend plan of care  []  Update interventions per flow sheet       []  Discharge due to:_  []  Other:_      Mandeep Jain, PTA, CLT  1/7/2022   Garrison An PT, CLT   TREATMENT PLAN EFFECTIVE DATES:   1/7/2022 TO 4/3/2022  I have read the above plan of care for [facility]. I certify the above prescribed services are required by this patient and are medically necessary.   The above plan of care has been developed in conjunction with the lymphedema/physical therapist.   Physician Signature: ____________________________________________________      Dwight Jimenez NP       Date: _________________________________________________________________

## 2022-01-12 NOTE — PROGRESS NOTES
2022      CC: left shoulder injection    HPI:      This is a 80y.o. year old male who presents for follow up of their left shoulder injection. The patient states that they have had very good relief of their pain. The time since injection has been one week. PMH:  Past Medical History:   Diagnosis Date    Cancer ([facility] [address]     Stage 4 Lung Cancer    GERD (gastroesophageal reflux disease)     Lymphedema     Urinary incontinence        PSxHx:  Past Surgical History:   Procedure Laterality Date    HX ORTHOPAEDIC Left     Achilles     HX ORTHOPAEDIC Right     knee surery    HX ROTATOR CUFF REPAIR Left        Meds:    Current Outpatient Medications:     senna-docusate (Vegetable Lax-Stool Softener) 8.6-50 mg per tablet, Take 1 Tablet by mouth daily. , Disp: , Rfl:     acetaminophen/diphenhydramine (TYLENOL PM PO), Take 500 mg by mouth daily. At bedtime, Disp: , Rfl:     esomeprazole magnesium (NEXIUM 24HR PO), Take  by mouth., Disp: , Rfl:     All:  No Known Allergies    Social Hx:  Social History     Socioeconomic History    Marital status:    Tobacco Use    Smoking status: Former Smoker     Quit date: 10/5/1991     Years since quittin.2    Smokeless tobacco: Never Used   Vaping Use    Vaping Use: Never used   Substance and Sexual Activity    Alcohol use: Not Currently    Drug use: Never    Sexual activity: Not Currently       Family Hx:  No family history on file.       Review of Systems:       General: Denies headache, lethargy, fever, weight loss  Ears/Nose/Throat: Denies ear discharge, drainage, nosebleeds, hoarse voice, dental problems  Cardiovascular: Denies chest pain, shortness of breath  Lungs: Denies chest pain, breathing problems, wheezing, pneumonia  Stomach: Denies stomach pain, heartburn, constipation, irritable bowel  Skin: Denies rash, sores, open wounds  Musculoskeletal: Left shoulder pain - resolved  Genitourinary: Denies dysuria, hematuria, polyuria  Gastrointestinal: Denies constipation, obstipation, diarrhea  Neurological: Denies changes in sight, smell, hearing, taste, seizures. Denies loss of consciousness. Psychiatric: Denies depression, sleep pattern changes, anxiety, change in personality  Endocrine: Denies mood swings, heat or cold intolerance  Hematologic/Lymphatic: Denies anemia, purpura, petechia  Allergic/Immunologic: Denies swelling of throat, pain or swelling at lymph nodes      Physical Examination:    There were no vitals taken for this visit. General: AOX3, no apparent distress  Psychiatric: mood and affect appropriate        Diagnostics:    Pertinent Diagnostics: none    Assessment: Status post left shoulder aspiration injection  Plan: This patient and I discussed the normal course for injections, we discussed that pain relief will likely be temporary to some degree, but I cannot predict the longevity of relief. We also discussed the limitations of injections, and that I cannot inject the same area any more often than every three months. We will proceed with continued conservative management, follow up as needed. Patient was in [place] at the time of consultation. I was in the office while conducting this encounter. Consent:  He and/or his healthcare decision maker is aware that this patient-initiated Telehealth encounter is a billable service, with coverage as determined by his insurance carrier. He is aware that he may receive a bill and has provided verbal consent to proceed: Yes    This virtual visit was conducted telephone encounter only. -  I affirm this is a Patient Initiated Episode with an Established Patient who has not had a related appointment within my department in the past 7 days or scheduled within the next 24 hours. Note: this encounter is not billable if this call serves to triage the patient into an appointment for the relevant concern. Total Time: minutes: 5-10 minutes.         Mr. Felipe Peterson has a reminder for a \"due or due soon\" health maintenance. I have asked that he contact his primary care provider for follow-up on this health maintenance.

## 2022-01-17 NOTE — PROGRESS NOTES
Pt sent down the douglas for guardant 360 and labs. Information provided to him about alectinib. His son in law was present. Pt lives across the street in assited living facility. He has hearing aids but is hard of hearing, he said he has a land line that has closed captioning. His daughter Miranda Salazar can be a contact for him as well. Pt to call us when he gets the medication in the mail. Requested Prescriptions     Pending Prescriptions Disp Refills    alectinib 150 mg cap 60 Capsule 11     Sig: Take 4 Capsules by mouth two (2) times a day.      Approved per Sherlie Frankel from [facility]

## 2022-01-17 NOTE — PROGRESS NOTES
[facility] Lab Note    1625 Pt arrived at [facility] and in no distress for labs. No new complaints voiced. Labs drawn from right forearm. MD office notified to  360 kit. See [facility] for pended cbc and cmp. Patient denied having any symptoms of COVID-19, i.e. SOB, coughing, fever, or generally not feeling well. Also denies having been exposed to COVID-19 recently or having had any recent contact with family/friend that has a pending COVID test.     Visit Vitals  BP (!) 153/65   Pulse 65   Temp 98.7 °F (37.1 °C)   Resp 18       1635 Tolerated treatment well, no adverse reaction noted. D/Cd from [facility] via and in no distress accompanied by son. No further appointments scheduled at this time.

## 2022-01-17 NOTE — PROGRESS NOTES
[facility] [address], [address] [facility], [address] [place] [address]  [phone]        Oncology Note        Patient: Wood Lennon MRN: [id number]  SSN: [id number]    YOB: 1929  Age: 80 y.o. Sex: male      Subjective:      Wood Lennon is a 80 y.o. 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 He was diagnosed over 5 years ago and saw another medical oncologist. He was on Xalkori for two years which he stopped 3 years ago due to side effects which includes edema in the legs. He comes in with his son-in-law. Review of Systems:    Constitutional: negative  Eyes: negative  Ears, Nose, Mouth, Throat, and Face: negative  Respiratory: negative  Cardiovascular: negative  Gastrointestinal: negative  Genitourinary:negative  Integument/Breast: negative  Hematologic/Lymphatic: negative  Musculoskeletal:negative  Neurological: negative        Past Medical History:   Diagnosis Date    Cancer ([facility] [address]     Stage 4 Lung Cancer    GERD (gastroesophageal reflux disease)     Lymphedema     Urinary incontinence      Past Surgical History:   Procedure Laterality Date    HX ORTHOPAEDIC Left     Achilles     HX ORTHOPAEDIC Right     knee surery    HX ROTATOR CUFF REPAIR Left       History reviewed. No pertinent family history. Social History     Tobacco Use    Smoking status: Former Smoker     Quit date: 10/5/1991     Years since quittin.3    Smokeless tobacco: Never Used   Substance Use Topics    Alcohol use: Not Currently      Prior to Admission medications    Medication Sig Start Date End Date Taking? Authorizing Provider   alfuzosin SR (UROXATRAL) 10 mg SR tablet  21  Yes Provider, Historical   oxyCODONE IR (ROXICODONE) 5 mg immediate release tablet Take 5 mg by mouth every four (4) hours as needed for Pain.    Yes Provider, Historical   alectinib 150 mg cap Take 4 Capsules by mouth two (2) times a day. 1/17/22  Yes Michael Duke MD   senna-docusate (Vegetable Lax-Stool Softener) 8.6-50 mg per tablet Take 1 Tablet by mouth daily. Yes Provider, Historical   acetaminophen/diphenhydramine (TYLENOL PM PO) Take 500 mg by mouth daily. At bedtime   Yes Provider, Historical   esomeprazole magnesium (NEXIUM 24HR PO) Take  by mouth. Yes Provider, Historical              No Known Allergies        Objective:     Vitals:    01/17/22 1530   BP: (!) 157/84   Pulse: 62   Temp: 98.2 °F (36.8 °C)   TempSrc: Temporal   SpO2: 94%   Height: 5' 10\" (1.778 m)            Physical Exam:  GENERAL: alert, cooperative, no distress, appears stated age  EYE: conjunctivae/corneas clear. LYMPHATIC: Cervical, supraclavicular, and axillary nodes normal.   THROAT & NECK: normal and no erythema or exudates noted. LUNG: clear to auscultation bilaterally  HEART: regular rate and rhythm, S1, S2 normal, no murmur, click, rub or gallop  ABDOMEN: soft, non-tender. Bowel sounds normal. No masses,  no organomegaly  EXTREMITIES:  extremities normal, atraumatic, no cyanosis or edema  SKIN: Normal.  NEUROLOGIC: AOx3. Gait normal. Reflexes and motor strength normal and symmetric. Cranial nerves 2-12 and sensation grossly intact. Assessment:     1. 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 He did not tolerate Xalkori due to the side effects of edema. I asked him what his goals are. He said extension of life in clear terms. However he does not want to take the same medicine again. I educated him that the we have better medicines which are more efficacious and less toxic. He wants to take it if he can. I confirmed that with him.      Repeat imaging - CT, bone scan   Repeat comprehensive genomic analysis on the blood for ALK mutation    Recommend he start Alectinib. I discussed him the potential side effects of low blood counts, nausea, muscle enzyme and LFT elevation. He understands and wants to take the medicine. 2. Severe protein calorie malnutrition    Dietary consult  Increase protein intake      Plan:       1. CT chest, abd,pelvis  2. Bone scan  3. Start Alectinib  4. Palliative care referral      Signed By: Marianela Mac MD     January 17, 2022        CC. Alex Cote MD  CC.  Murray Stewart MD

## 2022-01-17 NOTE — PROGRESS NOTES
Eloina Gupta is a 80 y.o. male here for new patient appt for tumor on left shoulder  He has past history of stage 4 lung cancer diagnosed 2017. Referred by Dr Yaya Langley  Pt has had thoracentesis multiple times recently. ( 5 times total - once since moved here in August)  Pt treated with Nataly Barfielde when diagnosed but did not take well. Made him swell. He has been off for 2 years. Pt here with son in law. He presents in wheelchair and is hard for him to get up. Pt here for tumor in left shoulder that appeared about one month ago. No pain associated with it. 1. Have you been to the ER, urgent care clinic since your last visit? Hospitalized since your last visit? New Pt    2. Have you seen or consulted any other health care providers outside of the [address] [place] [address] since your last visit? Include any pap smears or colon screening.   New Pt

## 2022-01-17 NOTE — LETTER
1/17/2022    Patient: Chuck Urbina   YOB: 1929   Date of Visit: 1/17/2022     Edmar Leonard MD  [address] [facility] [address] [address]  [facility] [address]  [address]ia [address]  [address]  [facility] [address]  Via [place] [facility] [address]    Dear MD Awa Hubbard, DO,      Thank you for referring Mr. Chuck Urbina to [address] [place] [address] for evaluation. My notes for this consultation are attached. If you have questions, please do not hesitate to call me. I look forward to following your patient along with you.       Sincerely,    Nava Gill MD

## 2022-01-17 NOTE — PROGRESS NOTES
Oncology Social Work  Psychosocial Assessment    Reason for Assessment:      [] Social Work Referral [x] Initial Assessment [] New Diagnosis [] Other    Advance Care Planning:  No flowsheet data found. Sources of Information:    [x]Patient  [x]Family  [x]Staff  [x]Medical Record    Mental Status:    [x]Alert  []Lethargic  []Unresponsive   [] Unable to assess   Oriented to:  [x]Person  [x]Place  [x]Time  [x]Situation      Relationship Status:  []Single  []  []Significant Other/Life Partner  []  []  [x]    Living Circumstances:  []Lives Alone  [x]Family/Significant Other in Household  []Roommates  []Children in the Home  []Paid Caregivers  []Assisted Living Facility/Group Home  []Skilled [address]  []Homeless  []Incarcerated  []Environmental/Care Concerns  []Other:    Employment Status:  []Employed Full-time []Employed Part-time []Homemaker  [] Retired [] Short-Term Disability [] [facility]  [] Unemployed   [] SSI   [] SSDI  [] Self-Employment    Barriers to Learning:    []Language  []Developmental  []Cognitive  []Altered Mental Status  [x]Visual/Hearing Impairment  []Unable to Read/Write  []Motivational  [] Challenges Understanding Medical Jargon []No Barriers Identified      Financial/Legal Concerns:    []Uninsured  [x]Limited Income/Resources  []Non-Citizen  []Food Insecurity [x]No Concerns Identified   []Other:    Yazdanism/Spiritual/Existential:  Does patient have any spiritual or Mosque beliefs? [x] Yes [] No  Is the patient involved in a spiritual, isis or Mosque community?  [] Yes [x] No  Patient expressing spiritual/existential angst? [] Yes [] No  Notes:    Support System:    Identified Support Person/Group:  []Strong  [x]Fair  []Limited    Coping with Illness:   [x]  Coping Well  [] Challenges Coping with Serious Illness [] Situational Depression [] Situational Anxiety [] Anticipatory Grief  [] Recent Loss [] Caregiver [place]            Narrative: Met with patient  and his son in law (dtr is a ortho nurse at [employer]) to introduce social work navigator role and supports. Pt  for a little over a year and lives in [facility] across the street. Pt has 3 dtr , 1 more in [place] and one in [place].   Pt ASHLYN states pt is Oriental orthodox.      Plan:   1. Introduce self and role of the  in the [facility] [facility] [address] 2. Informed the patient of the [facility] and available resources there. 3. Continue to meet with the patient when he returns to the clinic for ongoing assessment of the patient's adjustment to his diagnosis and treatment. 4. Ongoing psychosocial support as desired by patient. Referral/Handouts:     Complementary therapies referral  Financial/Medication assistance referral       Shatnhi Ray.  TAMMY Jaimes/RAFITA  Supervisee in Social Work

## 2022-01-18 NOTE — TELEPHONE ENCOUNTER
Quantity supply not correct. Updated and resent. Also called in correction. Requested Prescriptions     Pending Prescriptions Disp Refills    alectinib 150 mg cap 240 Capsule 11     Sig: Take 4 Capsules by mouth two (2) times a day for 30 days.

## 2022-01-24 NOTE — TELEPHONE ENCOUNTER
[place] called needs additional information on Cancer Dx.   Please follow up with caller at [phone] Option 1, Acct code:  [de-identified]

## 2022-01-24 NOTE — TELEPHONE ENCOUNTER
Prescription alectinib needs to go to [facility] due to free drug program.

## 2022-02-04 NOTE — TELEPHONE ENCOUNTER
[facility] Palliative Medicine Office  Nursing Note  [phone]  Fax [phone]      Name:  Marshall Umaña  YOB: 1929    Received outpatient Palliative Medicine referral on 2/4/22 from Dr. Kishan Mensah to see patient for symptom management and supportive care. Chart  reviewed. Masrhall Umaña is a 80 y.o. male with NSCLC with pleural and bone mets. Patient's most recent office visit with Dr. Nae Lozano was on 1/17/22. See his note for complete HPI, treatment history, and plan. Today Dr. Nae Lozano also ordered a referral to dietitian, CT of chest with contrast, CT of abd/pelvis, and bone scan. ACP:    No ACP documents on file    Patient's daughter John Adair called Palliative office to schedule an appt. This nurse explained to patient that Dr. Madie Boas prefers that her nurse schedule the appointments and she will call Ms. Borrero back soon for scheduling. She voices understanding and says the best call back number for her is [phone].     Desiree Juarez, FRIDAN, RN-BC, [facility]

## 2022-02-04 NOTE — TELEPHONE ENCOUNTER
[address] at [facility], [address] [facility]   W: [phone]  F: [phone]      Medical Nutrition Therapy  Nutrition Referral:    Referral received from [facility]. Called and spoke with patient,  explained that RD is available to address nutrition throughout the spectrum of care. He reports he is eating ok. He reports some weight loss over the past few months and has slowly been improving. Last night for dinner he had beef stew, blue cheese salad. This morning, he had oatmeal, apple juice, fruit, cancino, eggs, toast and coffee. We discussed that treatment may cause side effects that result in appetite changes. Encouraged him to reach out for any nutrition related questions or concerns. Will send him a [employer] message with additional information and open the communication channel. Contact information provided. Severe protein-calorie malnutrition [place]: less than 60% of standard weight) ([facility] [E43]    ICD-10-CM ICD-9-CM    1. Severe protein-calorie malnutrition [place]: less than 60% of standard weight) (HCC)  E43 262 REFERRAL TO DIETITIAN      REFERRAL TO PALLIATIVE MEDICINE   2. Malignant neoplasm of lung, unspecified laterality, unspecified part of lung (HCC)  C34.90 162.9 REFERRAL TO DIETITIAN   3.  Non-small cell lung cancer metastatic to bone (HCC)  C34.90 162.9 REFERRAL TO PALLIATIVE MEDICINE    C79.51 198.5 NM BONE SCAN WH BODY      CT CHEST W CONT      CT ABD PELV W CONT     Wt Readings from Last 5 Encounters:   01/04/22 133 lb (60.3 kg)   10/21/21 120 lb (54.4 kg)   10/21/21 133 lb 13.1 oz (60.7 kg)       Signed By: Dsutin French, [address]

## 2022-02-04 NOTE — TELEPHONE ENCOUNTER
Upon reviewing pt chart to determine when needs to be seen since should have received medication in the mail 2/1/22, pt needs updated imaging and referrals to dietician and palliative care. Will order and notify pt daughter. VORB FROM DR Cathy Godoy NM BONE SCAN [address] BODY [address] CHEST ABD PELV W CONTRAST. VORB FROM DR Cathy Godoy REFERRAL TO PALLIATIVE CARE AND DIETICIAN       Called pt daughter. No answer. Left detailed VM asking for a call back. Should see once scans are done. Need confirmation of when started the alectinib.

## 2022-02-04 NOTE — TELEPHONE ENCOUNTER
Pt daughter called back. Pt started medication on Tuesday 2/1/22. He has scans scheduled for 2/14/22. Please call him to schedule him for after his scan. Can call him or his daughter to do so, also please inquire who his previous medical oncologist was so we can get those records.

## 2022-02-08 PROBLEM — I50.1 ACUTE LEFT-SIDED CHF (CONGESTIVE HEART FAILURE) (HCC): Status: ACTIVE | Noted: 2022-01-01

## 2022-02-08 NOTE — H&P
Hospitalist Admission Note    NAME:  David Eastman   :   1929   MRN:   [id number]     Date of admit: 2022    PCP: Eleni Cronin MD    Assessment/Plan:     Acute metabolic encephalopathy POA due to hypercarbia  NS reports patient was alert and talking on arrival  Progressively lethargic  When I  went to see patient, groaned when stimulated, not verbal or following commands  D/w ER staff  ABG with pH 7.21, pCO2 90, pO2 106  Head CT scan negative  Manage respiratory issues as below    Acute on chronic respiratory failure with hypoxia and hypercarbia POA RA sats 81%  Acute left CHF POA  Underlying COPD POA  ? Post obstructive right lung pneumonia POA  Presents with weakness, SOB, hypoxia at pulmonology office  Recently started new chemotherapy alectinib past week   Associated with bradycardia, Interstitial lung disease(? Too soon given recent start)  No prior cardiac history  ABG on 3 L NC pH 7.21, pCO2 90, pO2 106   Suspect some of the hypercarbia is chronic    elevated CO2 on chemistry since 2021, likely renal compensation  Baseline does not wear home O2  pCXR pulmonary edema, mod to large right effusion  CT chest Cut off sign of the right mainstem bronchus with right lower lobe collapse    multifocal airspace disease in the right upper lobe and right middle lobe.     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 Given the patient's current clinical presentation, I have a high level of concern for decompensation if discharged from the emergency department. My assessment of this patient's clinical condition and my plan of care is as noted above. DVT prophylaxis with lovenox    Code status: DNR/DNI  NOK: Daughter    History     CHIEF COMPLAINT: Sent from pulmonary clinic with SOB and RA sats 81%    HISTORY OF PRESENT ILLNESS:    80-year-old male    Patient not on home oxygen    Stage IV cancer with mets to bone and pleural, Diagnosed 5 years ago    History of right pleural effusion last documented drainage was in October 2021   Just started a new chemotherapy agent alectinib this week   I do not see any studies or cytology    Seen in pulmonology clinic today, was hypoxic sats 81% on room air, short of breath  Sent to ED  Spoke with daughter, patient has been gaining weight and becoming edematous over the past week  Edema initially in the Left upper extremity, now more generalized  Very short of breath walking over the past few days, any minimal exertion gets very winded  No cough  Generalized weakness has not been L support himself and is fallen out of his chair several times  No prior CHF or CAD  Vaccinated against Covid-19 x 2 and received the booster    Emergency room  Improved on 2 to 4 L nasal cannula oxygen  Was talking per nursing staff when he arrived, was a bit sleepy  Progressively lethargic, was minimally responsive, non verbal, not following commands when I saw  Head CT negative  ABG on 3 L NC pH 7.21, pCO2 90, pO2 106  pCXR pulmonary edema, mod to large right effusion  CT chest Cut off sign of the right mainstem bronchus with right lower lobe collapse               multifocal airspace disease in the right upper lobe and right middle lobe.                Associated loculated pleural effusion  pBNP 3312  Placed on BIPAP  IV lasix  We were called to admit the patient  Spoke with daughter at bedside      Past Medical History:   Diagnosis Date    Cancer [facility])     Stage 4 Lung Cancer    GERD (gastroesophageal reflux disease)     Lymphedema     Urinary incontinence         Past Surgical History:   Procedure Laterality Date    HX ORTHOPAEDIC Left     Achilles     HX ORTHOPAEDIC Right     knee surery    HX ROTATOR CUFF REPAIR Left        Social History     Tobacco Use    Smoking status: Former Smoker     Quit date: 10/5/1991     Years since quittin.3    Smokeless tobacco: Never Used   Substance Use Topics    Alcohol use: Not Currently        Family history:  3 children healthy  Mother had kidney cancer, COPD  Father  when pt was young  Only child      No Known Allergies     Prior to Admission medications    Medication Sig Start Date End Date Taking? Authorizing Provider   alectinib 150 mg cap Take 4 Capsules by mouth two (2) times a day for 30 days. 22  Chris Worley MD   alfuzosin SR (UROXATRAL) 10 mg SR tablet  21   Provider, Historical   oxyCODONE IR (ROXICODONE) 5 mg immediate release tablet Take 5 mg by mouth every four (4) hours as needed for Pain. Provider, Historical   senna-docusate (Vegetable Lax-Stool Softener) 8.6-50 mg per tablet Take 1 Tablet by mouth daily. Provider, Historical   acetaminophen/diphenhydramine (TYLENOL PM PO) Take 500 mg by mouth daily. At bedtime    Provider, Historical   esomeprazole magnesium (NEXIUM 24HR PO) Take  by mouth.     Provider, Historical       Review of symptoms:     POSITIVE= Bold  Negative = not bold  General:  fever, chills, sweats, generalized weakness, weight loss     loss of appetite   Eyes:    blurred vision, eye pain, double vision  ENT:    Coryza, sore throat, trouble swallowing  Respiratory:   cough, sputum, SOB  Cardiology:   chest pain, orthopnea, PND, edema  Gastrointestinal:  abdominal pain , N/V, diarrhea, constipation, melena or BRBPR  Genitourinary:  Urgency, dysuria, hematuria  Muskuloskeletal :  Joint redness, swelling or acute joint pain, myalgias  Hematology:  easy bruising, nose or gum bleeding  Dermatological: rash, ulceration  Endocrine:   Polyuria or polydipsia, heat or hold intolerance  Neurological:  Headache, focal motor or sensory changes     Speech difficulties, memory loss  Psychological: depression, agitation      Objective:   VITALS:    Patient Vitals for the past 24 hrs:   Temp Pulse Resp BP SpO2   22 1505 98.2 °F (36.8 °C) 82 16 (!) 137/91 97 %     Temp (24hrs), Av.2 °F (36.8 °C), Min:98.2 °F (36.8 °C), Max:98.2 °F (36.8 °C)           Wt Readings from Last 12 Encounters:   22 58.3 kg (128 lb 8.5 oz)   22 60.3 kg (133 lb)   10/21/21 54.4 kg (120 lb)   10/21/21 60.7 kg (133 lb 13.1 oz)         PHYSICAL EXAM:     I had a face to face encounter and independently examined this patient on 22  as outlined below:    General:    Lethargic, stirs when stimulated, not verbal  HEENT: Normocephalic, atraumatic    PERRL,  Sclera no icterus    Nasal mucosa without masses or discharge     Oropharynx without erythema or exudate   Neck:  No meningismus, trachea midline, no carotid bruits     Thyroid not enlarged, no nodules or tenderness  Lungs:   Decreased BS right lung. No wheezing    No accessory muscle use or retractions. Heart:   Regular rate and rhythm,  no murmur or gallop. 2+ LE edema  Abdomen:   Soft, non-tender. Not distended. Bowel sounds normal.     No masses, No Hepatosplenomegaly, No Rebound or guarding  Lymph nodes: No cervical or inguinal ADIS  Musculoskeletal:  No Joint swelling, erythema, warmth.  No Cyanosis or clubbing  Skin:      No rashes     Not Jaundiced   No nodules or thickening  Neurologic: Lethargic, stirs when stimulated, not verbal         LAB DATA REVIEWED:    Recent Results (from the past 12 hour(s))   EKG, 12 LEAD, INITIAL    Collection Time: 22  3:08 PM   Result Value Ref Range    Ventricular Rate 69 BPM    Atrial Rate 288 BPM    QRS Duration 122 ms    Q-T Interval 394 ms    QTC Calculation (Bezet) 422 ms    Calculated R Axis -12 degrees    Calculated T Axis 78 degrees    Diagnosis       Atrial fibrillation with premature ventricular or aberrantly conducted   complexes  Right bundle branch block  No previous ECGs available     CBC WITH AUTOMATED DIFF    Collection Time: 02/08/22  3:46 PM   Result Value Ref Range    WBC 11.2 (H) 4.1 - 11.1 K/uL    RBC 4.72 4.10 - 5.70 M/uL    HGB 13.8 12.1 - 17.0 g/dL    HCT 43.7 36.6 - 50.3 %    MCV 92.6 80.0 - 99.0 FL    MCH 29.2 26.0 - 34.0 PG    MCHC 31.6 30.0 - 36.5 g/dL    RDW 16.5 (H) 11.5 - 14.5 %    PLATELET 255 (H) 425 - 400 K/uL    MPV 9.3 8.9 - 12.9 FL    NRBC 0.0 0  WBC    ABSOLUTE NRBC 0.00 0.00 - 0.01 K/uL    NEUTROPHILS 84 (H) 32 - 75 %    LYMPHOCYTES 7 (L) 12 - 49 %    MONOCYTES 8 5 - 13 %    EOSINOPHILS 0 0 - 7 %    BASOPHILS 0 0 - 1 %    IMMATURE GRANULOCYTES 1 (H) 0.0 - 0.5 %    ABS. NEUTROPHILS 9.4 (H) 1.8 - 8.0 K/UL    ABS. LYMPHOCYTES 0.8 0.8 - 3.5 K/UL    ABS. MONOCYTES 0.9 0.0 - 1.0 K/UL    ABS. EOSINOPHILS 0.0 0.0 - 0.4 K/UL    ABS. BASOPHILS 0.0 0.0 - 0.1 K/UL    ABS. IMM. GRANS. 0.1 (H) 0.00 - 0.04 K/UL    DF SMEAR SCANNED      RBC COMMENTS NORMOCYTIC, NORMOCHROMIC     METABOLIC PANEL, COMPREHENSIVE    Collection Time: 02/08/22  3:46 PM   Result Value Ref Range    Sodium 133 (L) 136 - 145 mmol/L    Potassium 3.8 3.5 - 5.1 mmol/L    Chloride 96 (L) 97 - 108 mmol/L    CO2 33 (H) 21 - 32 mmol/L    Anion gap 4 (L) 5 - 15 mmol/L    Glucose 92 65 - 100 mg/dL    BUN 26 (H) 6 - 20 MG/DL    Creatinine 0.71 0.70 - 1.30 MG/DL    BUN/Creatinine ratio 37 (H) 12 - 20      GFR est AA >60 >60 ml/min/1.73m2    GFR est non-AA >60 >60 ml/min/1.73m2    Calcium 8.6 8.5 - 10.1 MG/DL    Bilirubin, total 0.5 0.2 - 1.0 MG/DL    ALT (SGPT) 26 12 - 78 U/L    AST (SGOT) 32 15 - 37 U/L    Alk.  phosphatase 196 (H) 45 - 117 U/L    Protein, total 6.4 6.4 - 8.2 g/dL    Albumin 3.2 (L) 3.5 - 5.0 g/dL    Globulin 3.2 2.0 - 4.0 g/dL    A-G Ratio 1.0 (L) 1.1 - 2.2     CK W/ REFLX CKMB    Collection Time: 02/08/22  3:46 PM   Result Value Ref Range     39 - 308 U/L   TROPONIN-HIGH SENSITIVITY    Collection Time: 02/08/22  3:46 PM   Result Value Ref Range    Troponin-High Sensitivity 29 0 - 76 ng/L   NT-PRO BNP    Collection Time: 02/08/22  3:46 PM   Result Value Ref Range    NT pro-BNP 3,312 (H) <450 PG/ML   COVID-19 RAPID TEST    Collection Time: 02/08/22  4:22 PM   Result Value Ref Range    Specimen source Nasopharyngeal      COVID-19 rapid test Not detected NOTD         EKG as read by me shows      CT Results  (Last 48 hours)    None            XR CHEST PORT    Result Date: 2/8/2022  New pulmonary edema. New right pleural effusion. A superimposed infiltrate cannot be excluded        I saw the patient personally, took a history and did a complete physical exam at the bedside. I performed complex decision making in coming up with a diagnostic and treatment plan for the patient. I reviewed the patient's past medical records, current laboratory and radiology results, and actual Xray films/EKG. I have also discussed this case with the involved ED physician.     Care Plan discussed with:    Patient, Family, ED Doc    Risk of deterioration:  High    Total Time Coordinating Admission: 65    minutes    Total Critical Care Time:         Sagrario Hoyos MD

## 2022-02-08 NOTE — ED NOTES
Upon arrival back from CT patient rigid but responds to pain and is able to verbalize that he is in pain.

## 2022-02-08 NOTE — ED PROVIDER NOTES
EMERGENCY DEPARTMENT HISTORY AND PHYSICAL EXAM          Date: 2/8/2022  Patient Name: Marshall Umaña  Attending of Record: Rafaela Nichole    History of Presenting Illness     Chief Complaint   Patient presents with    Shortness of Breath       History Provided By: Patient and EMS    HPI: Marshall Umaña is a 80 y.o. male with a PMH of GERD, Lung cancer stage IV, lymphadema who presents today with SOB. Pt was at his pulmonologist office today and was found to be 81% on room air. The patient has been complaining of feeling fatigue and weak for the past few days. He says he feels better now that he is on oxygen. He says that he gets recurrent fluid on his lungs and they have to take fluid out. PCP: Nicholas Zhang MD    There are no other complaints, changes, or physical findings at this time.      Current Facility-Administered Medications   Medication Dose Route Frequency Provider Last Rate Last Admin    sodium chloride (NS) flush 5-40 mL  5-40 mL IntraVENous Q8H Navarro Oviedo MD        sodium chloride (NS) flush 5-40 mL  5-40 mL IntraVENous PRN Mo Vicente MD        acetaminophen (TYLENOL) tablet 650 mg  650 mg Oral Q6H PRN Mo Vicente MD        Or    acetaminophen (TYLENOL) suppository 650 mg  650 mg Rectal Q6H PRN Mo Vicente MD        [START ON 2/9/2022] polyethylene glycol (MIRALAX) packet 17 g  17 g Oral DAILY Navarro Oviedo MD        bisacodyL (DULCOLAX) tablet 5 mg  5 mg Oral DAILY PRN Mo Vicente MD        promethazine (PHENERGAN) tablet 12.5 mg  12.5 mg Oral Q6H PRN Mo Vicente MD        Or    ondansetron [facility]) injection 4 mg  4 mg IntraVENous Q6H PRN MD Maria Del Carmen Martin ON 2/9/2022] enoxaparin (LOVENOX) injection 40 mg  40 mg SubCUTAneous DAILY Navarro Oviedo MD         Current Outpatient Medications   Medication Sig Dispense Refill    alectinib 150 mg cap Take 4 Capsules by mouth two (2) times a day for 30 days. 240 Capsule 11    alfuzosin SR (UROXATRAL) 10 mg SR tablet       oxyCODONE IR (ROXICODONE) 5 mg immediate release tablet Take 5 mg by mouth every four (4) hours as needed for Pain.  senna-docusate (Vegetable Lax-Stool Softener) 8.6-50 mg per tablet Take 1 Tablet by mouth daily.  acetaminophen/diphenhydramine (TYLENOL PM PO) Take 500 mg by mouth daily. At bedtime      esomeprazole magnesium (NEXIUM 24HR PO) Take  by mouth. Past History     Past Medical History:  Past Medical History:   Diagnosis Date    Cancer ([facility] [address]     Stage 4 Lung Cancer    GERD (gastroesophageal reflux disease)     Lymphedema     Urinary incontinence        Past Surgical History:  Past Surgical History:   Procedure Laterality Date    HX ORTHOPAEDIC Left     Achilles     HX ORTHOPAEDIC Right     knee surery    HX ROTATOR CUFF REPAIR Left        Family History:  History reviewed. No pertinent family history. Social History:  Social History     Tobacco Use    Smoking status: Former Smoker     Quit date: 10/5/1991     Years since quittin.3    Smokeless tobacco: Never Used   Vaping Use    Vaping Use: Never used   Substance Use Topics    Alcohol use: Not Currently    Drug use: Never       Allergies:  No Known Allergies      Review of Systems   Review of Systems   Constitutional: Positive for fatigue. Negative for chills and fever. HENT: Negative for sore throat. Respiratory: Positive for shortness of breath. Negative for cough. Cardiovascular: Negative for chest pain. Gastrointestinal: Negative for abdominal pain, nausea and vomiting. Genitourinary: Negative for dysuria, frequency and urgency. Musculoskeletal: Negative for back pain. Skin: Negative for rash. Neurological: Positive for weakness. Negative for headaches. Psychiatric/Behavioral: Negative for confusion.        Physical Exam   Physical Exam  Constitutional:       Comments: Drowsy but awakes and alert for  questions HENT:      Head: Normocephalic and atraumatic. Eyes:      Pupils: Pupils are equal, round, and reactive to light. Cardiovascular:      Rate and Rhythm: Normal rate and regular rhythm. Pulses: Normal pulses. Pulmonary:      Effort: Pulmonary effort is normal. No accessory muscle usage. Breath sounds: Normal breath sounds. Abdominal:      General: Bowel sounds are normal.      Palpations: Abdomen is soft. Tenderness: There is no abdominal tenderness. Musculoskeletal:      Comments: Bilateral lower extremity 2+ pitting edema, right upper ext edema   Neurological:      General: No focal deficit present. Mental Status: He is alert and oriented to person, place, and time. Diagnostic Study Results     Labs -     Recent Results (from the past 12 hour(s))   EKG, 12 LEAD, INITIAL    Collection Time: 02/08/22  3:08 PM   Result Value Ref Range    Ventricular Rate 69 BPM    Atrial Rate 288 BPM    QRS Duration 122 ms    Q-T Interval 394 ms    QTC Calculation (Bezet) 422 ms    Calculated R Axis -12 degrees    Calculated T Axis 78 degrees    Diagnosis       Atrial fibrillation with premature ventricular or aberrantly conducted   complexes  Right bundle branch block  No previous ECGs available     CBC WITH AUTOMATED DIFF    Collection Time: 02/08/22  3:46 PM   Result Value Ref Range    WBC 11.2 (H) 4.1 - 11.1 K/uL    RBC 4.72 4.10 - 5.70 M/uL    HGB 13.8 12.1 - 17.0 g/dL    HCT 43.7 36.6 - 50.3 %    MCV 92.6 80.0 - 99.0 FL    MCH 29.2 26.0 - 34.0 PG    MCHC 31.6 30.0 - 36.5 g/dL    RDW 16.5 (H) 11.5 - 14.5 %    PLATELET 411 (H) 365 - 400 K/uL    MPV 9.3 8.9 - 12.9 FL    NRBC 0.0 0  WBC    ABSOLUTE NRBC 0.00 0.00 - 0.01 K/uL    NEUTROPHILS 84 (H) 32 - 75 %    LYMPHOCYTES 7 (L) 12 - 49 %    MONOCYTES 8 5 - 13 %    EOSINOPHILS 0 0 - 7 %    BASOPHILS 0 0 - 1 %    IMMATURE GRANULOCYTES 1 (H) 0.0 - 0.5 %    ABS. NEUTROPHILS 9.4 (H) 1.8 - 8.0 K/UL    ABS. LYMPHOCYTES 0.8 0.8 - 3.5 K/UL    ABS. MONOCYTES 0.9 0.0 - 1.0 K/UL    ABS. EOSINOPHILS 0.0 0.0 - 0.4 K/UL    ABS. BASOPHILS 0.0 0.0 - 0.1 K/UL    ABS. IMM. GRANS. 0.1 (H) 0.00 - 0.04 K/UL    DF SMEAR SCANNED      RBC COMMENTS NORMOCYTIC, NORMOCHROMIC     METABOLIC PANEL, COMPREHENSIVE    Collection Time: 02/08/22  3:46 PM   Result Value Ref Range    Sodium 133 (L) 136 - 145 mmol/L    Potassium 3.8 3.5 - 5.1 mmol/L    Chloride 96 (L) 97 - 108 mmol/L    CO2 33 (H) 21 - 32 mmol/L    Anion gap 4 (L) 5 - 15 mmol/L    Glucose 92 65 - 100 mg/dL    BUN 26 (H) 6 - 20 MG/DL    Creatinine 0.71 0.70 - 1.30 MG/DL    BUN/Creatinine ratio 37 (H) 12 - 20      GFR est AA >60 >60 ml/min/1.73m2    GFR est non-AA >60 >60 ml/min/1.73m2    Calcium 8.6 8.5 - 10.1 MG/DL    Bilirubin, total 0.5 0.2 - 1.0 MG/DL    ALT (SGPT) 26 12 - 78 U/L    AST (SGOT) 32 15 - 37 U/L    Alk.  phosphatase 196 (H) 45 - 117 U/L    Protein, total 6.4 6.4 - 8.2 g/dL    Albumin 3.2 (L) 3.5 - 5.0 g/dL    Globulin 3.2 2.0 - 4.0 g/dL    A-G Ratio 1.0 (L) 1.1 - 2.2     CK W/ REFLX CKMB    Collection Time: 02/08/22  3:46 PM   Result Value Ref Range     39 - 308 U/L   TROPONIN-HIGH SENSITIVITY    Collection Time: 02/08/22  3:46 PM   Result Value Ref Range    Troponin-High Sensitivity 29 0 - 76 ng/L   NT-PRO BNP    Collection Time: 02/08/22  3:46 PM   Result Value Ref Range    NT pro-BNP 3,312 (H) <450 PG/ML   COVID-19 RAPID TEST    Collection Time: 02/08/22  4:22 PM   Result Value Ref Range    Specimen source Nasopharyngeal      COVID-19 rapid test Not detected NOTD     BLOOD GAS, ARTERIAL    Collection Time: 02/08/22  6:06 PM   Result Value Ref Range    pH 7.21 (LL) 7.35 - 7.45      PCO2 90 (H) 35 - 45 mmHg    PO2 106 (H) 80 - 100 mmHg    O2 SAT 96 92 - 97 %    BICARBONATE 35 (H) 22 - 26 mmol/L    BASE EXCESS 3.8 mmol/L    O2 METHOD NASAL CANNULA      Sample source ARTERIAL      SITE RIGHT RADIAL      FLAQUITO'S TEST YES      Critical value read back Called to Ryan Bull RN on 02/08/2022 at 18:07 Radiologic Studies -   CT HEAD WO CONT   Final Result   No acute process seen      XR CHEST PORT   Final Result   New pulmonary edema. New right pleural effusion. A superimposed   infiltrate cannot be excluded      CT CHEST WO CONT    (Results Pending)     CT Results  (Last 48 hours)               02/08/22 1751  CT HEAD WO CONT Final result    Impression:  No acute process seen       Narrative:  EXAM: CT HEAD WO CONT       INDICATION: altered mental status       COMPARISON: None. CONTRAST: None. TECHNIQUE: Unenhanced CT of the head was performed using 5 mm images. Brain and   bone windows were generated. Coronal and sagittal reformats. CT dose reduction   was achieved through use of a standardized protocol tailored for this   examination and automatic exposure control for dose modulation. FINDINGS:   The ventricles and sulci are normal in size, shape and configuration. There is   moderate periventricular white matter hypodensity. . There is no intracranial   hemorrhage, extra-axial collection, or mass effect. The basilar cisterns are   open. No CT evidence of acute infarct. The bone windows demonstrate no abnormalities. The visualized portions of the   paranasal sinuses and mastoid air cells are clear. CXR Results  (Last 48 hours)               02/08/22 1559  XR CHEST PORT Final result    Impression:  New pulmonary edema. New right pleural effusion. A superimposed   infiltrate cannot be excluded       Narrative:  EXAM: XR CHEST PORT       INDICATION: Shortness of breath       COMPARISON: 10/21/2021       FINDINGS: A portable AP radiograph of the chest was obtained at 1549 hours. The   patient is on a cardiac monitor. The cardiac mediastinal silhouette is   unchanged. There is superimposed pulmonary edema. The right hemidiaphragm   remains elevated. In addition, there is a moderate to large right pleural   effusion. Heart size is stable.                    Medical Decision Making   I am the first provider for this patient. I reviewed the vital signs, available nursing notes, past medical history, past surgical history, family history and social history. Vital Signs-Reviewed the patient's vital signs. Patient Vitals for the past 12 hrs:   Temp Pulse Resp BP SpO2   02/08/22 1800  78 22 139/79 91 %   02/08/22 1730  77 23 139/79 96 %   02/08/22 1650  72 19 121/72 99 %   02/08/22 1620  71 16 119/83    02/08/22 1505 98.2 °F (36.8 °C) 82 16 (!) 137/91 97 %       ECG Interpretation: normal rate HR 69, atrial fibrillation, normal axis, qrs interval normal, qtc interval normal, no st elevations, artifact noted     Records Reviewed: Nursing Notes and Old Medical Records    Provider Notes (Medical Decision Making):   Pt presents with SOB and hypoxia likely due to pleural effusions as this has been a problem for him in the past. Patient is on 4L NC, bp stable. Will plan to get labs and CXR. Pt will likely be admitted to the hospital     ED Course and Progress Notes:   Initial assessment performed. The patients presenting problems have been discussed, and they are in agreement with the care plan formulated and outlined with them. I have encouraged them to ask questions as they arise throughout their visit. ED Course as of 02/08/22 1818 Tue Feb 08, 2022   1726 On reassessment pt altered no longer speaking is obtunded ordering a CT head and abg [ND]   1741 Daughter reports patient is DNR/DNI [ND]   1807 Patient back from CT. Appears agitated and yells \"stop it\" while RT getting abg [ND]   0656 PH 7.2, PCO2 90, plan to place on bipap, hospitalist is aware  [ND]      ED Course User Index  [ND] Vida Pena MD             Diagnosis     Clinical Impression:   1. Pleural effusion    2. Hypoxia    3.  Hypercapnemia        Disposition:  Admitted to the hospital           Resident Signature: Renée Abrams MD

## 2022-02-08 NOTE — ED NOTES
Entered patient room to find patient minimally responsive. Vital signs remain stable. Dr. Rosario Joyner at bedside to evaluate patient.

## 2022-02-08 NOTE — Clinical Note
Status[de-identified] INPATIENT [101]   Type of Bed: Telemetry [19]   Cardiac Monitoring Required?: Yes   Inpatient Hospitalization Certified Necessary for the Following Reasons: 3.  Patient receiving treatment that can only be provided in an inpatient setting (further clarification in H&P documentation)   Admitting Diagnosis: Acute left-sided CHF (congestive heart failure) [facility]) [[id number]]   Admitting Physician: Rajan Nava   Attending Physician: Rjaan Nava   Estimated Length of Stay: 3-4 Midnights   Discharge Plan[de-identified] Home with Office Follow-up

## 2022-02-08 NOTE — ED NOTES
Pt. Presents to ED today for complaints of increased SOB from his pulmonologist office. Patient also complaining of increased swelling to BLE. Per MD office patient sats were 81% on room air. Upon arrival to ED patient with room air sats at 97%. Patient does not appear to be in distress. Patient is drowsy. Pt. Placed in position of comfort with call bell in reach.

## 2022-02-08 NOTE — ED NOTES
Patient sats dropped to 87% on room air at rest.  Patient placed on 2 L nasal cannula. Sats up to 99%.

## 2022-02-09 NOTE — ROUTINE PROCESS
1900: Outgoing RN alerted me to call [phone] to receive rport. 1907: Attempted to call ED nurse x3 for report but no response. Spoke with ED charge and referred me to call same # [phone].    1937: TRANSFER - IN REPORT:    Verbal report received from [address] [place] [address],[address] (name) on Ankita Chino  being received from ED (unit) for routine progression of care      Report consisted of patients Situation, Background, Assessment and   Recommendations(SBAR). Information from the following report(s) SBAR, Kardex, ED Summary, Intake/Output, MAR, Recent Results, Med Rec Status and Cardiac Rhythm A. Fib was reviewed with the receiving nurse. Opportunity for questions and clarification was provided. Assessment completed upon patients arrival to unit and care assumed. 1957: Patient arrived on PCU unit with nurse and RT on BiPAP.     Primary Nurse Richard Patel and Julissa Taylor, RN performed a dual skin assessment on this patient Impairment noted- Stage 2 wound on sacrum  Roberto Carlos score is 13

## 2022-02-09 NOTE — CARDIO/PULMONARY
Cardiac Rehab:    Chart reviewed. Pt admitted with HF, echo pending. Pt on bipap, notes indicate pt is minimally responsive. May not be appropriate for teaching at this time.

## 2022-02-09 NOTE — CONSULTS
Pulmonary, Critical Care, and Sleep Medicine    Initial Patient Consult    Name: Eloina Gupta MRN: [id number]   : 1929 Hospital: [facility]   Date: 2022        IMPRESSION:   · Encephalopathy, Increased co2 levels. Rechecked pco2 and decreased to 68. Head Ct was negative for acute changes. · Acute on Chronic Respiratory Failure-on bipap. · Has recurrent pleural effusions. · COPD with acute exacerbation. · Stage iv non small cell lung cancer. Mets to bone and pleura. Has RLL collapse. · CHF, increased proBNP. · Felt to have anasarca. · Generalized weakness. · S/p Covid vaccine and booster. · Chronic Right Pleural effusion. Loculated pleural effusion,  Has pulmonary edema. · Low procalcitonin level. · GERD  · Hx of smoking. · Pt is Do Not Resuscitate and DNI. · Prognosis is guarded. · Critically ill, with multiple organ failure. 35 min CC, EOP. · I called and spoke to pts Primary Decision Maker: Lester Almeida. Ph: [phone]. Discussed the risks, benefits and alternatives to bipap. She agrees to continue this Rx for now. Will reassess in am.   · IF not improving over next 24hrs, would consider hospice. RECOMMENDATIONS:   · Serial ABGs as needed  · Will continue current level of bipap for severe hypoxic and hypercarbic respiratory failure  · IF needed for sedation, I agree haldol would be better than ativan. · Not able to safely do thoracentesis due to his mental status. · ON diuresis  · ON Zosyn for possible pneumonia. · Will follow. Subjective: This patient has been seen and evaluated at the request of Dr. Duane Martínez for above. Patient is a 80 y.o. male who was seen in Dr. Harika Jin office. Noted to have worsening fluid retention, worsening hypoxia. Was sent to ER. Per EHR has been worse over the last week. Has severe dyspnea with any exerton. No cough was reported.    Noted to have worsening generalized weakness has not been L support himself and is fallen out of his chair several times at home, per his family.        Emergency room  Improved on 2 to 4 L nasal cannula oxygen  Was talking per nursing staff when he arrived, was a bit sleepy  Progressively lethargic, was minimally responsive, non verbal, not following commands when I saw  Head CT negative        Reviewed PAR notes from Dr. Tiki Herring:  First had lung cancer in 2017. He was note tolerating thoracentesis due to pain. May have trapped lung. Had 4 prior thoracentesis. Has been very short of breath on 2/8/2022. Had been starting a new agent per Dr. Emmanuel Carson. Has new bone metastasis. Has been with increased leg swelling. Desats to 81% on RA. Has been getting worse. Had prior Thoracentesis on 10/2022. Past Medical History:   Diagnosis Date    Cancer ([facility] [address]     Stage 4 Lung Cancer    GERD (gastroesophageal reflux disease)     Lymphedema     Urinary incontinence       Past Surgical History:   Procedure Laterality Date    HX ORTHOPAEDIC Left     Achilles     HX ORTHOPAEDIC Right     knee surery    HX ROTATOR CUFF REPAIR Left       Prior to Admission medications    Medication Sig Start Date End Date Taking? Authorizing Provider   alectinib 150 mg cap Take 4 Capsules by mouth two (2) times a day for 30 days. 1/24/22 2/23/22  Guillaume Salazar MD   alfuzosin SR (UROXATRAL) 10 mg SR tablet  12/29/21   Provider, Historical   oxyCODONE IR (ROXICODONE) 5 mg immediate release tablet Take 5 mg by mouth every four (4) hours as needed for Pain. Provider, Historical   senna-docusate (Vegetable Lax-Stool Softener) 8.6-50 mg per tablet Take 1 Tablet by mouth daily. Provider, Historical   acetaminophen/diphenhydramine (TYLENOL PM PO) Take 500 mg by mouth daily. At bedtime    Provider, Historical   esomeprazole magnesium (NEXIUM 24HR PO) Take  by mouth.     Provider, Historical     No Known Allergies   Social History     Tobacco Use    Smoking status: Former Smoker     Quit date: 10/5/1991     Years since quittin.3    Smokeless tobacco: Never Used   Substance Use Topics    Alcohol use: Not Currently      History reviewed. No pertinent family history. Current Facility-Administered Medications   Medication Dose Route Frequency    balsam peru-castor oiL (VENELEX) ointment   Topical BID    pantoprazole (PROTONIX) tablet 40 mg  40 mg Oral ACB    sodium chloride (NS) flush 5-40 mL  5-40 mL IntraVENous Q8H    polyethylene glycol (MIRALAX) packet 17 g  17 g Oral DAILY    enoxaparin (LOVENOX) injection 40 mg  40 mg SubCUTAneous DAILY    furosemide (LASIX) injection 40 mg  40 mg IntraVENous BID    aspirin chewable tablet 81 mg  81 mg Oral DAILY    albuterol-ipratropium (DUO-NEB) 2.5 MG-0.5 MG/3 ML  3 mL Nebulization Q6H RT    piperacillin-tazobactam (ZOSYN) 3.375 g in 0.9% sodium chloride (MBP/ADV) 100 mL MBP  3.375 g IntraVENous Q8H       Review of Systems:  Review of systems not obtained due to patient factors. with encephalopathy and on bipap. Objective:   Vital Signs:    Visit Vitals  BP (!) 124/56   Pulse 86   Temp 97.2 °F (36.2 °C)   Resp 21   Ht 5' 6\" (1.676 m)   Wt 58.2 kg (128 lb 4.8 oz)   SpO2 98%   BMI 20.71 kg/m²       O2 Device: BIPAP       Temp (24hrs), Av.7 °F (36.5 °C), Min:97.2 °F (36.2 °C), Max:98.2 °F (36.8 °C)       Intake/Output:   Last shift:      No intake/output data recorded. Last 3 shifts: No intake/output data recorded. No intake or output data in the 24 hours ending 22 1051   Physical Exam:   General:  Sleepy on bipap, no distress, appears stated age. Head:  Normocephalic, without obvious abnormality, atraumatic. Eyes:  Conjunctivae/corneas clear. PERRL, EOMs intact. Nose: Nares normal. Septum midline. Mucosa normal. No drainage or sinus tenderness.    Throat: Lips, mucosa, and tongue normal. Teeth and gums normal.   Neck: Supple, symmetrical, trachea midline, no adenopathy, thyroid: no enlargment/tenderness/nodules, no carotid bruit and no JVD. Back:   Symmetric, no curvature. ROM normal.   Lungs:   Decreased BS on the right side. Chest wall:  No tenderness or deformity. Heart:  Regular rate and rhythm, S1, S2 normal, no murmur, click, rub or gallop. Abdomen:   Soft, non-tender. Bowel sounds normal. No masses,  No organomegaly. Extremities: Extremities normal, atraumatic, no cyanosis. Has 2+ edema. Pulses: 2+ and symmetric all extremities. Skin: Skin color, texture, turgor normal. No rashes or lesions   Lymph nodes: Cervical, supraclavicular, and axillary nodes normal.   Neurologic: NOt able to assess due to his poor mental status. Data review:     Recent Results (from the past 24 hour(s))   EKG, 12 LEAD, INITIAL    Collection Time: 02/08/22  3:08 PM   Result Value Ref Range    Ventricular Rate 69 BPM    Atrial Rate 288 BPM    QRS Duration 122 ms    Q-T Interval 394 ms    QTC Calculation (Bezet) 422 ms    Calculated R Axis -12 degrees    Calculated T Axis 78 degrees    Diagnosis       Atrial fibrillation with premature ventricular or aberrantly conducted   complexes  Right bundle branch block  No previous ECGs available  Confirmed by Isaias Hubbard ([id number]) on 2/9/2022 9:00:05 AM     CBC WITH AUTOMATED DIFF    Collection Time: 02/08/22  3:46 PM   Result Value Ref Range    WBC 11.2 (H) 4.1 - 11.1 K/uL    RBC 4.72 4.10 - 5.70 M/uL    HGB 13.8 12.1 - 17.0 g/dL    HCT 43.7 36.6 - 50.3 %    MCV 92.6 80.0 - 99.0 FL    MCH 29.2 26.0 - 34.0 PG    MCHC 31.6 30.0 - 36.5 g/dL    RDW 16.5 (H) 11.5 - 14.5 %    PLATELET 450 (H) 881 - 400 K/uL    MPV 9.3 8.9 - 12.9 FL    NRBC 0.0 0  WBC    ABSOLUTE NRBC 0.00 0.00 - 0.01 K/uL    NEUTROPHILS 84 (H) 32 - 75 %    LYMPHOCYTES 7 (L) 12 - 49 %    MONOCYTES 8 5 - 13 %    EOSINOPHILS 0 0 - 7 %    BASOPHILS 0 0 - 1 %    IMMATURE GRANULOCYTES 1 (H) 0.0 - 0.5 %    ABS. NEUTROPHILS 9.4 (H) 1.8 - 8.0 K/UL    ABS. LYMPHOCYTES 0.8 0.8 - 3.5 K/UL    ABS. MONOCYTES 0.9 0.0 - 1.0 K/UL    ABS. EOSINOPHILS 0.0 0.0 - 0.4 K/UL    ABS. BASOPHILS 0.0 0.0 - 0.1 K/UL    ABS. IMM. GRANS. 0.1 (H) 0.00 - 0.04 K/UL    DF SMEAR SCANNED      RBC COMMENTS NORMOCYTIC, NORMOCHROMIC     METABOLIC PANEL, COMPREHENSIVE    Collection Time: 02/08/22  3:46 PM   Result Value Ref Range    Sodium 133 (L) 136 - 145 mmol/L    Potassium 3.8 3.5 - 5.1 mmol/L    Chloride 96 (L) 97 - 108 mmol/L    CO2 33 (H) 21 - 32 mmol/L    Anion gap 4 (L) 5 - 15 mmol/L    Glucose 92 65 - 100 mg/dL    BUN 26 (H) 6 - 20 MG/DL    Creatinine 0.71 0.70 - 1.30 MG/DL    BUN/Creatinine ratio 37 (H) 12 - 20      GFR est AA >60 >60 ml/min/1.73m2    GFR est non-AA >60 >60 ml/min/1.73m2    Calcium 8.6 8.5 - 10.1 MG/DL    Bilirubin, total 0.5 0.2 - 1.0 MG/DL    ALT (SGPT) 26 12 - 78 U/L    AST (SGOT) 32 15 - 37 U/L    Alk.  phosphatase 196 (H) 45 - 117 U/L    Protein, total 6.4 6.4 - 8.2 g/dL    Albumin 3.2 (L) 3.5 - 5.0 g/dL    Globulin 3.2 2.0 - 4.0 g/dL    A-G Ratio 1.0 (L) 1.1 - 2.2     CK W/ REFLX CKMB    Collection Time: 02/08/22  3:46 PM   Result Value Ref Range     39 - 308 U/L   TROPONIN-HIGH SENSITIVITY    Collection Time: 02/08/22  3:46 PM   Result Value Ref Range    Troponin-High Sensitivity 29 0 - 76 ng/L   NT-PRO BNP    Collection Time: 02/08/22  3:46 PM   Result Value Ref Range    NT pro-BNP 3,312 (H) <450 PG/ML   COVID-19 RAPID TEST    Collection Time: 02/08/22  4:22 PM   Result Value Ref Range    Specimen source Nasopharyngeal      COVID-19 rapid test Not detected NOTD     BLOOD GAS, ARTERIAL    Collection Time: 02/08/22  6:06 PM   Result Value Ref Range    pH 7.21 (LL) 7.35 - 7.45      PCO2 90 (H) 35 - 45 mmHg    PO2 106 (H) 80 - 100 mmHg    O2 SAT 96 92 - 97 %    BICARBONATE 35 (H) 22 - 26 mmol/L    BASE EXCESS 3.8 mmol/L    O2 METHOD NASAL CANNULA      Sample source ARTERIAL      SITE RIGHT RADIAL      FLAQUITO'S TEST YES      Critical value read back Called to Gerald Pritchett RN on 02/08/2022 at 54:14    METABOLIC PANEL, COMPREHENSIVE Collection Time: 02/09/22  2:58 AM   Result Value Ref Range    Sodium 134 (L) 136 - 145 mmol/L    Potassium 3.8 3.5 - 5.1 mmol/L    Chloride 95 (L) 97 - 108 mmol/L    CO2 32 21 - 32 mmol/L    Anion gap 7 5 - 15 mmol/L    Glucose 76 65 - 100 mg/dL    BUN 27 (H) 6 - 20 MG/DL    Creatinine 0.76 0.70 - 1.30 MG/DL    BUN/Creatinine ratio 36 (H) 12 - 20      GFR est AA >60 >60 ml/min/1.73m2    GFR est non-AA >60 >60 ml/min/1.73m2    Calcium 8.5 8.5 - 10.1 MG/DL    Bilirubin, total 0.7 0.2 - 1.0 MG/DL    ALT (SGPT) 26 12 - 78 U/L    AST (SGOT) 30 15 - 37 U/L    Alk. phosphatase 179 (H) 45 - 117 U/L    Protein, total 5.9 (L) 6.4 - 8.2 g/dL    Albumin 3.0 (L) 3.5 - 5.0 g/dL    Globulin 2.9 2.0 - 4.0 g/dL    A-G Ratio 1.0 (L) 1.1 - 2.2     CBC WITH AUTOMATED DIFF    Collection Time: 02/09/22  2:58 AM   Result Value Ref Range    WBC 12.0 (H) 4.1 - 11.1 K/uL    RBC 4.87 4.10 - 5.70 M/uL    HGB 14.2 12.1 - 17.0 g/dL    HCT 45.7 36.6 - 50.3 %    MCV 93.8 80.0 - 99.0 FL    MCH 29.2 26.0 - 34.0 PG    MCHC 31.1 30.0 - 36.5 g/dL    RDW 16.9 (H) 11.5 - 14.5 %    PLATELET 565 (H) 630 - 400 K/uL    MPV 9.5 8.9 - 12.9 FL    NRBC 0.0 0  WBC    ABSOLUTE NRBC 0.00 0.00 - 0.01 K/uL    NEUTROPHILS 82 (H) 32 - 75 %    LYMPHOCYTES 5 (L) 12 - 49 %    MONOCYTES 12 5 - 13 %    EOSINOPHILS 0 0 - 7 %    BASOPHILS 0 0 - 1 %    IMMATURE GRANULOCYTES 1 (H) 0.0 - 0.5 %    ABS. NEUTROPHILS 9.8 (H) 1.8 - 8.0 K/UL    ABS. LYMPHOCYTES 0.6 (L) 0.8 - 3.5 K/UL    ABS. MONOCYTES 1.5 (H) 0.0 - 1.0 K/UL    ABS. EOSINOPHILS 0.0 0.0 - 0.4 K/UL    ABS. BASOPHILS 0.0 0.0 - 0.1 K/UL    ABS. IMM. GRANS. 0.1 (H) 0.00 - 0.04 K/UL    DF AUTOMATED     PROCALCITONIN    Collection Time: 02/09/22  2:58 AM   Result Value Ref Range    Procalcitonin <0.05 ng/mL       Imaging:  I have personally reviewed the patients radiographs and have reviewed the reports:  2-8-2022: FINDINGS:     CHEST WALL: No mass or axillary lymphadenopathy. THYROID: No nodule.   MEDIASTINUM: No mass or lymphadenopathy. KEY: No mass or lymphadenopathy. THORACIC AORTA: No aneurysm. MAIN PULMONARY ARTERY: Normal in caliber. TRACHEA/BRONCHI: Cut off sign of right mainstem bronchus (axial image 33). ESOPHAGUS: No wall thickening or dilatation. HEART: Normal in size. Coronary artery calcification  PLEURA: Right basilar effusion loculated with air, loculated at lung base and  posteriorly. Small left pleural effusion. LUNGS: Multifocal airspace disease throughout the entire right lung. Right lower  lobe is collapsed. INCIDENTALLY IMAGED UPPER ABDOMEN: Chronic granulomata. Exophytic 2 cm left  renal cyst.  BONES: Right scapular. 8 mm sclerotic focus (sagittal image 18). Sclerotic focus  in the anterior aspect of the T10 vertebra (sagittal image 74).    IMPRESSION  Cut off sign of the right mainstem bronchus in association with right lower lobe  collapse and multifocal airspace disease in the right upper lobe and right  middle lobe. Associated loculated pleural effusion     Indeterminate sclerotic focus in the T10 vertebra. Recommend correlation with  bone scan.     Incidental small left pleural effusion with left basilar subsegmental  atelectasis and coronary artery calcification.         Tonie Du MD

## 2022-02-09 NOTE — PROGRESS NOTES
ADULT PROTOCOL: JET AEROSOL ASSESSMENT    Patient  Ankita Chino     80 y.o.   male     2/9/2022  3:46 AM    Breath Sounds Pre Procedure: Right Breath Sounds: Diminished                               Left Breath Sounds: Diminished    Breath Sounds Post Procedure: Right Breath Sounds: Diminished                                 Left Breath Sounds: Diminished    Breathing pattern: Pre procedure Breathing Pattern: Regular          Post procedure Breathing Pattern: Regular    Heart Rate: Pre procedure Pulse: 90           Post procedure Pulse: 82    Resp Rate: Pre procedure Respirations: 16           Post procedure Respirations: 21    Peak Flow: Pre bronchodilator             Post bronchodilator       FVC/FEV1:  n/a    Incentive Spirometry:             Cough: Pre procedure                 Post procedure      Suctioned: NO    Sputum: Pre procedure                   Post procedure      Oxygen: O2 Device: BIPAP   FiO2 (%) 30%     Changed: YES,wean from 50% to 30% FiO2    SpO2: Pre procedure SpO2: 100 %   with oxygen              Post procedure SpO2: 99 %  with oxygen    Nebulizer Therapy: Current medications Aerosolized Medications: DuoNeb      Changed: NO    Smoking History: n/a    Problem List:   Patient Active Problem List   Diagnosis Code    Urinary incontinence R32    GERD (gastroesophageal reflux disease) K21.9    Cancer (HCC) C80.1    Lymphedema I89.0    Pressure injury of sacral region, stage 1 L89.151    Opioid use, unspecified with unspecified opioid-induced disorder F11.99    Acute left-sided CHF (congestive heart failure) ([facility] [address] I50.1       Respiratory Therapist: Geoffrey Love RT

## 2022-02-09 NOTE — PROGRESS NOTES
Comprehensive Nutrition Assessment    Type and Reason for Visit: Initial,Positive nutrition screen,Consult    Nutrition Recommendations/Plan:   · Continue cardiac diet; if po declines, recommend liberalize diet to regular. · RD ordered Ensure Enlive PO BID with meals. · Please document % meals and supplements consumed in flowsheet I/O's under intake. Nutrition Assessment:     2/9: Chart reviewed; med noted for acute on chronic resp failure, requiring BIPAP now. Hx of CHF, metabolic encephalopathy, stage 4 non small cell lung ca, recently started chemo. Some recent weight loss noted so added ensure enlive shakes BID with meals which will also help meet wound healing needs. No data found. Last Weight Metric  Weight Loss Metrics 2/8/2022 1/17/2022 1/4/2022 10/21/2021 10/21/2021 10/5/2021   Today's Wt 128 lb 4.8 oz - 133 lb 133 lb 13.1 oz 120 lb -   BMI 20.71 kg/m2 19.08 kg/m2 19.08 kg/m2 19.2 kg/m2 17.22 kg/m2 -       Estimated Daily Nutrient Needs:  Energy (kcal): 1771 (BMR 1170 x 1. 3AF) + 250 kcals; Weight Used for Energy Requirements:    Protein (g): 58-69 (1.0-1.2 g/kg bw); Weight Used for Protein Requirements: Current  Fluid (ml/day): 1800 ml/day; Method Used for Fluid Requirements: 1 ml/kcal    Nutrition Related Findings:  Labs: stable; Meds: reviewed      Wounds:    Stage II       Current Nutrition Therapies:  ADULT DIET Regular; Low Fat/Low Chol/High Fiber/2 gm Na  ADULT ORAL NUTRITION SUPPLEMENT Breakfast, Dinner; Standard High Calorie/High Protein    Anthropometric Measures:  · Height:  5' 6\" (167.6 cm)  · Current Body Wt:  58.2 kg (128 lb 4.9 oz)   · Admission Body Wt:       · Usual Body Wt:        · Ideal Body Wt:  142 lbs:  90.4 %   · Adjusted Body Weight:   ; Weight Adjustment for:     · Adjusted BMI:       · BMI Category:  Underweight (BMI less than 22) age over 72       Nutrition Diagnosis:   · Inadequate protein-energy intake related to catabolic illness as evidenced by weight loss      Nutrition Interventions:   Food and/or Nutrient Delivery: Continue current diet,Start oral nutrition supplement  Nutrition Education and Counseling: No recommendations at this time  Coordination of Nutrition Care: Continue to monitor while inpatient    Goals:  Trend PO intake at least 50% of meals + consume 240 ml ONS next 5-7 day       Nutrition Monitoring and Evaluation:   Behavioral-Environmental Outcomes: None identified  Food/Nutrient Intake Outcomes: Food and nutrient intake,Supplement intake  Physical Signs/Symptoms Outcomes: Biochemical data,Skin,Weight    Discharge Planning:    Continue current diet,Continue oral nutrition supplement     Electronically signed by Shelly Church RD on 2/9/2022 at 12:29 PM

## 2022-02-09 NOTE — PROGRESS NOTES
Received message from patient's nurse stating:    Patient is on BiPAP and had Ativan earlier for restless and tried pulling BiPAP. Patient started gaining wakefulness and attempting to pull BiPAP and monitor leads. Discussion / orders:    Ativan 0.5 mg iv x 1         Please note that this note was dictated using Dragon computer voice recognition software. Quite often unanticipated grammatical, syntax, homophones, and other interpretive errors are inadvertently transcribed by the computer software. Please disregard these errors. Please excuse any errors that have escaped final proofreading.      Signed by:  Jane Juárez DNP, ACNP-BC

## 2022-02-09 NOTE — PROGRESS NOTES
Physical Therapy    Order received and chart reviewed. Pt is currently on Bipap and is minimally responsive. Pt not appropriate for PT evaluation at this time. Will defer and continue to follow.

## 2022-02-09 NOTE — PROGRESS NOTES
Problem: Pressure Injury - Risk of  Goal: *Prevention of pressure injury  Description: Document Roberto Carlos Scale and appropriate interventions in the flowsheet. Outcome: Progressing Towards Goal  Note: Pressure Injury Interventions:  Sensory Interventions: Check visual cues for pain,Assess need for specialty bed,Assess changes in LOC,Float heels,Discuss PT/OT consult with provider,Keep linens dry and wrinkle-free,Maintain/enhance activity level,Minimize linen layers,Monitor skin under medical devices,Pad between skin to skin,Pressure redistribution bed/mattress (bed type),Turn and reposition approx. every two hours (pillows and wedges if needed)    Moisture Interventions: Absorbent underpads,Check for incontinence Q2 hours and as needed,Minimize layers,Moisture barrier,Apply protective barrier, creams and emollients,Internal/External urinary devices    Activity Interventions: Pressure redistribution bed/mattress(bed type),PT/OT evaluation    Mobility Interventions: Float heels,HOB 30 degrees or less,Pressure redistribution bed/mattress (bed type),Turn and reposition approx. every two hours(pillow and wedges),PT/OT evaluation    Nutrition Interventions: Document food/fluid/supplement intake,Offer support with meals,snacks and hydration,Discuss nutritional consult with provider    Friction and Shear Interventions: Apply protective barrier, creams and emollients,HOB 30 degrees or less,Lift sheet,Minimize layers                Problem: Falls - Risk of  Goal: *Absence of Falls  Description: Document Gladys Fall Risk and appropriate interventions in the flowsheet.   Outcome: Progressing Towards Goal  Note: Fall Risk Interventions:       Mentation Interventions: Adequate sleep, hydration, pain control,Bed/chair exit alarm,Evaluate medications/consider consulting pharmacy    Medication Interventions: Assess postural VS orthostatic hypotension,Bed/chair exit alarm,Evaluate medications/consider consulting pharmacy    Elimination Interventions: Call light in reach,Bed/chair exit alarm,Patient to call for help with toileting needs

## 2022-02-09 NOTE — CONSULTS
Palliative Medicine Consult  Nishant: [phone] ([phone])    Patient Name: Eloina uGpta  YOB: 1929    Date of Initial Consult: 2/8/22  Reason for Consult: Care Decisions  Requesting Provider: Vida Burleson MD  Primary Care Physician: Mello Ward MD     SUMMARY:   Eloina Gupta is a 80 y.o. with a past history of stage IV cancer with mets to bone and pleura, diagnosed 5 years ago, chronic pleural effusion with periodic thoracentesis (last on was Oct 2021), who was admitted on 2/8/2022 from home (via the [facility]) with a diagnosis of acute metabolic encephalopathy due to hypercarbia, 2/2 acute on chronic respiratory failure with hypoxia and hypercarbia due to CHF, COPD, and possible post obstructive RLL PNA in the setting of lung cancer. Current medical issues leading to Palliative Medicine involvement include: goals of care in the setting of frailty and worsening respiratory status     PALLIATIVE DIAGNOSES:   1. Goals of care  2. Obtunded  3. Delirium  4. Restlessness  5. Palliative Encounter     PLAN:   1. Mr Katy Lagunas is in bed, on bipap, no labored breathing and appeared comfortable until I tried to stimulate him- he did not respond to me, but became restless and moaning  2. I spoke at length with his daughter Christian Cornejo- she is an RN at [employer] so has a lot of healthcare knowledge, and was able to share with me some of her dads struggles but also his successes over the last 5 years. It has been hard for him recently, as he was getting so winded with minimal activity, and she shared that his goals have been changing- he was sharing he wanted to get to their family vacation in September, but more recently he just wanted to get to his birthday which was on the [birth year], and to spread his ashes next to his wife's at the vacation in September.    3. His wife passed in May of 2021, which was a hard loss for the family, and Christian Cornejo shared that they have all treasured the last 5 years they got with  Grace Gamboa as a gift, but she knows that he might be reaching the end  4. She asked about the plan for today- I let her know that from my perspective, he is comfortable unless we are messing with him. I didn't think they would do the thoracentesis today, as he remains essentially unresponsive, and appears comfortable at this point  5. She is prepared that they will be making the decision to change treatment plans in the coming days if he is not improving, and she is clear that the ultimate goal is comfort, so if he is showing signs of discomfort despite treatment- then we need to be upfront with her  6. Will follow closely over the next few days for support and continued conversations  7. Initial consult note routed to primary continuity provider and/or primary health care team members  8. Communicated plan of care with: Palliative IDTDonald 192 Team    Receive a call from nursing that after his ECHO he became restless from the stimulation. Will order a PRN dose of Haldol to help with restlessness if he is trying to take off his bipap     GOALS OF CARE / TREATMENT PREFERENCES:     GOALS OF CARE:  Patient/Health Care Proxy Stated Goals: Comfort    TREATMENT PREFERENCES:   Code Status: DNR    Advance Care Planning:  [x] The [facility] Interdisciplinary Team has updated the ACP Navigator with [address] and Patient Capacity      Primary Decision Maker (Active): Carolin Olivera - Daughter - [phone]   Along with her sister    No flowsheet data found. Medical Interventions: Limited additional interventions       Other:    As far as possible, the palliative care team has discussed with patient / health care proxy about goals of care / treatment preferences for patient.      HISTORY:     History obtained from: chart, daughter    CHIEF COMPLAINT: none    HPI/SUBJECTIVE:    The patient is:   [] Verbal and participatory  [x] Non-participatory due to:   Condition    Clinical Pain Assessment (nonverbal scale for severity on nonverbal patients):   Clinical Pain Assessment  Severity: 0     Activity (Movement): Lying quietly, normal position    Duration: for how long has pt been experiencing pain (e.g., 2 days, 1 month, years)  Frequency: how often pain is an issue (e.g., several times per day, once every few days, constant)     FUNCTIONAL ASSESSMENT:     Palliative Performance Scale (PPS):  PPS: 20       PSYCHOSOCIAL/SPIRITUAL SCREENING:     Palliative IDT has assessed this patient for cultural preferences / practices and a referral made as appropriate to needs (Cultural Services, Patient Advocacy, Ethics, etc.)    Any spiritual / Alevism concerns:  [] Yes /  [x] No   If \"Yes\" to discuss with pastoral care during IDT     Does caregiver feel burdened by caring for their loved one:   [] Yes /  [x] No /  [] No Caregiver Present    If \"Yes\" to discuss with social work during IDT    Anticipatory grief assessment:   [x] Normal  / [] Maladaptive     If \"Maladaptive\" to discuss with social work during IDT    ESAS Anxiety: Anxiety: 0    ESAS Depression: Depression: 0        REVIEW OF SYSTEMS:     Positive and pertinent negative findings in ROS are noted above in HPI. The following systems were [x] reviewed / [] unable to be reviewed as noted in HPI  Other findings are noted below. Systems: constitutional, ears/nose/mouth/throat, respiratory, gastrointestinal, genitourinary, musculoskeletal, integumentary, neurologic, psychiatric, endocrine. Positive findings noted below. Modified ESAS Completed by: provider   Fatigue: 8     Depression: 0 Pain: 0   Anxiety: 0 Nausea: 0   Anorexia: 10 Dyspnea: 0                    PHYSICAL EXAM:     From RN flowsheet:  Wt Readings from Last 3 Encounters:   02/09/22 128 lb (58.1 kg)   01/04/22 133 lb (60.3 kg)   10/21/21 120 lb (54.4 kg)     Blood pressure 116/64, pulse 87, temperature 98.4 °F (36.9 °C), resp.  rate 19, height 5' 6\" (1.676 m), weight 128 lb (58.1 kg), SpO2 100 %. Pain Scale 1: Adult Nonverbal Pain Scale  Pain Intensity 1: 0                 Last bowel movement, if known:     Constitutional: obtunded  Cardiovascular: regular rhythm, distal pulses intact  Respiratory: breathing not labored, on bipap  Musculoskeletal: no deformity, no tenderness to palpation  Skin: warm, dry  Other:       HISTORY:     Active Problems:    Acute left-sided CHF (congestive heart failure) ([facility] [address] (2022)      Past Medical History:   Diagnosis Date    [facility]     Stage 4 Lung Cancer    GERD (gastroesophageal reflux disease)     Lymphedema     Urinary incontinence       Past Surgical History:   Procedure Laterality Date    HX ORTHOPAEDIC Left     Achilles     HX ORTHOPAEDIC Right     knee surery    HX ROTATOR CUFF REPAIR Left       History reviewed. No pertinent family history. History reviewed, no pertinent family history.   Social History     Tobacco Use    Smoking status: Former Smoker     Quit date: 10/5/1991     Years since quittin.3    Smokeless tobacco: Never Used   Substance Use Topics    Alcohol use: Not Currently     No Known Allergies   Current Facility-Administered Medications   Medication Dose Route Frequency    balsam peru-castor oiL (VENELEX) ointment   Topical BID    pantoprazole (PROTONIX) tablet 40 mg  40 mg Oral ACB    haloperidol lactate (HALDOL) injection 1 mg  1 mg IntraVENous Q6H PRN    sodium chloride (NS) flush 5-40 mL  5-40 mL IntraVENous Q8H    sodium chloride (NS) flush 5-40 mL  5-40 mL IntraVENous PRN    acetaminophen (TYLENOL) tablet 650 mg  650 mg Oral Q6H PRN    Or    acetaminophen (TYLENOL) suppository 650 mg  650 mg Rectal Q6H PRN    polyethylene glycol (MIRALAX) packet 17 g  17 g Oral DAILY    bisacodyL (DULCOLAX) tablet 5 mg  5 mg Oral DAILY PRN    promethazine (PHENERGAN) tablet 12.5 mg  12.5 mg Oral Q6H PRN    Or    ondansetron (ZOFRAN) injection 4 mg  4 mg IntraVENous Q6H PRN    enoxaparin (LOVENOX) injection 40 mg  40 mg SubCUTAneous DAILY    furosemide (LASIX) injection 40 mg  40 mg IntraVENous BID    aspirin chewable tablet 81 mg  81 mg Oral DAILY    albuterol-ipratropium (DUO-NEB) 2.5 MG-0.5 MG/3 ML  3 mL Nebulization Q6H RT    piperacillin-tazobactam (ZOSYN) 3.375 g in 0.9% sodium chloride (MBP/ADV) 100 mL MBP  3.375 g IntraVENous Q8H          LAB AND IMAGING FINDINGS:     Lab Results   Component Value Date/Time    WBC 12.0 (H) 02/09/2022 02:58 AM    HGB 14.2 02/09/2022 02:58 AM    PLATELET 405 (H) 98/48/3352 02:58 AM     Lab Results   Component Value Date/Time    Sodium 134 (L) 02/09/2022 02:58 AM    Potassium 3.8 02/09/2022 02:58 AM    Chloride 95 (L) 02/09/2022 02:58 AM    CO2 32 02/09/2022 02:58 AM    BUN 27 (H) 02/09/2022 02:58 AM    Creatinine 0.76 02/09/2022 02:58 AM    Calcium 8.5 02/09/2022 02:58 AM      Lab Results   Component Value Date/Time    Alk. phosphatase 179 (H) 02/09/2022 02:58 AM    Protein, total 5.9 (L) 02/09/2022 02:58 AM    Albumin 3.0 (L) 02/09/2022 02:58 AM    Globulin 2.9 02/09/2022 02:58 AM     No results found for: INR, PTMR, PTP, PT1, PT2, APTT, INREXT, INREXT   No results found for: IRON, FE, TIBC, IBCT, PSAT, FERR   Lab Results   Component Value Date/Time    pH 7.28 (L) 02/09/2022 11:13 AM    PCO2 69 (H) 02/09/2022 11:13 AM    PO2 93 02/09/2022 11:13 AM     No components found for: GLPOC   No results found for: CPK, CKMB             Total time: 50m  Counseling / coordination time, spent as noted above: 30m  > 50% counseling / coordination?: y    Prolonged service was provided for  []30 min   []75 min in face to face time in the presence of the patient, spent as noted above. Time Start:   Time End:   Note: this can only be billed with 63374 (initial) or 76312 (follow up). If multiple start / stop times, list each separately.

## 2022-02-09 NOTE — PROGRESS NOTES
End of Shift Note    Bedside shift change report given to Fiorella Arrington (oncoming nurse) by Narciso Foley (offgoing nurse). Report included the following information SBAR, Kardex, Intake/Output, MAR, Recent Results and Cardiac Rhythm Afib    Shift worked:  7 am to 7 pm     Shift summary and any significant changes:    Pt nonverbal throughout entire shift. Echo and repeat ABG completed. Mouth care done during shift. Haldol given twice since pt became more agitated. Concerns for physician to address:  none     Zone phone for oncoming shift:  none       Activity:  Activity Level: Bed Rest  Number times ambulated in hallways past shift: 0  Number of times OOB to chair past shift: 0    Cardiac:   Cardiac Monitoring: Yes      Cardiac Rhythm: Atrial Fib    Access:   Current line(s): PIV     Genitourinary:   Urinary status: incontinent and external catheter    Respiratory:   O2 Device: BIPAP; RT placed pt on 2 L NC towards end of shift  Chronic home O2 use?: NO  Incentive spirometer at bedside: NO     GI:     Current diet:  ADULT DIET Regular; Low Fat/Low Chol/High Fiber/2 gm Na  Passing flatus: YES  Tolerating current diet: Pt hasn't eaten today since he has been lethargic and on BIPAP       Pain Management:   Patient states pain is manageable on current regimen: N/A    Skin:  Roberto Carlos Score: 11  Interventions: turn team, speciality bed, float heels, PT/OT consult and internal/external urinary devices    Patient Safety:  Fall Score:  Total Score: 3  Interventions: bed/chair alarm and stay with me (per policy)  High Fall Risk: Yes    Length of Stay:  Expected LOS: - - -  Actual LOS: [address]

## 2022-02-09 NOTE — PROGRESS NOTES
INTERNAL MEDICINE ATTENDING NOTE     Patient Name: Janet Hopkins   : 1929   Admit: 2022    ASSESSMENT / PLAN    1. Acute on chronic respiratory failure, with hypoxia and hypercarbia POA: On BiPAP now. Pulmonology consulted. 2. Acute left-sided CHF (congestive heart failure) ([facility] [address] (2022): Diurese. 3. Possible post-obstructive pneumonia: On zosyn. However, normal procalcitonin. 4. Metabolic encephalopathy: Head CT negative. Manage respiratory failure. 5. COPD: Underlying/POA. 6. 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 Recently started a new chemotherapy with alectinib. 7. GERD: PPI. 8. DVT ppx with lovenox. 9. DNR/DNI                      SUBJECTIVE: Mr. Janet Hopkins was seen by me today during rounds. At this time, he is resting comfortably. The patient is asleep, not really rousable. ROS not obtainable. OBJECTIVE: Blood pressure (!) 117/59, pulse 97, temperature 97.2 °F (36.2 °C), resp. rate 21, height 5' 6\" (1.676 m), weight 128 lb 4.8 oz (58.2 kg), SpO2 97 %. Gen: Patient is in no acute distress. Lungs: Distant BS. Pt is on BiPAP. Heart: RRR. Abd: S, NT, ND, BS present. Exremities: Warm. Recent Results (from the past 12 hour(s))   METABOLIC PANEL, COMPREHENSIVE    Collection Time: 22  2:58 AM   Result Value Ref Range    Sodium 134 (L) 136 - 145 mmol/L    Potassium 3.8 3.5 - 5.1 mmol/L    Chloride 95 (L) 97 - 108 mmol/L    CO2 32 21 - 32 mmol/L    Anion gap 7 5 - 15 mmol/L    Glucose 76 65 - 100 mg/dL    BUN 27 (H) 6 - 20 MG/DL    Creatinine 0.76 0.70 - 1.30 MG/DL    BUN/Creatinine ratio 36 (H) 12 - 20      GFR est AA >60 >60 ml/min/1.73m2    GFR est non-AA >60 >60 ml/min/1.73m2    Calcium 8.5 8.5 - 10.1 MG/DL    Bilirubin, total 0.7 0.2 - 1.0 MG/DL    ALT (SGPT) 26 12 - 78 U/L    AST (SGOT) 30 15 - 37 U/L    Alk.  phosphatase 179 (H) 45 - 117 U/L    Protein, total 5.9 (L) 6.4 - 8.2 g/dL    Albumin 3.0 (L) 3.5 - 5.0 g/dL    Globulin 2.9 2.0 - 4.0 g/dL    A-G Ratio 1.0 (L) 1.1 - 2.2     CBC WITH AUTOMATED DIFF    Collection Time: 02/09/22  2:58 AM   Result Value Ref Range    WBC 12.0 (H) 4.1 - 11.1 K/uL    RBC 4.87 4.10 - 5.70 M/uL    HGB 14.2 12.1 - 17.0 g/dL    HCT 45.7 36.6 - 50.3 %    MCV 93.8 80.0 - 99.0 FL    MCH 29.2 26.0 - 34.0 PG    MCHC 31.1 30.0 - 36.5 g/dL    RDW 16.9 (H) 11.5 - 14.5 %    PLATELET 254 (H) 983 - 400 K/uL    MPV 9.5 8.9 - 12.9 FL    NRBC 0.0 0  WBC    ABSOLUTE NRBC 0.00 0.00 - 0.01 K/uL    NEUTROPHILS 82 (H) 32 - 75 %    LYMPHOCYTES 5 (L) 12 - 49 %    MONOCYTES 12 5 - 13 %    EOSINOPHILS 0 0 - 7 %    BASOPHILS 0 0 - 1 %    IMMATURE GRANULOCYTES 1 (H) 0.0 - 0.5 %    ABS. NEUTROPHILS 9.8 (H) 1.8 - 8.0 K/UL    ABS. LYMPHOCYTES 0.6 (L) 0.8 - 3.5 K/UL    ABS. MONOCYTES 1.5 (H) 0.0 - 1.0 K/UL    ABS. EOSINOPHILS 0.0 0.0 - 0.4 K/UL    ABS. BASOPHILS 0.0 0.0 - 0.1 K/UL    ABS. IMM. GRANS. 0.1 (H) 0.00 - 0.04 K/UL    DF AUTOMATED     PROCALCITONIN    Collection Time: 02/09/22  2:58 AM   Result Value Ref Range    Procalcitonin <0.05 ng/mL        CT chest WO contrast:  Cut off sign of the right mainstem bronchus in association with right lower lobe  collapse and multifocal airspace disease in the right upper lobe and right  middle lobe. Associated loculated pleural effusion     Indeterminate sclerotic focus in the T10 vertebra. Recommend correlation with  bone scan.     Incidental small left pleural effusion with left basilar subsegmental  atelectasis and coronary artery calcification. CT head WO contrast:   No acute process seen     CXR:   New pulmonary edema. New right pleural effusion. A superimposed  infiltrate cannot be excluded    Vickie Combs MD, FACP  Best contact is via Pager: [phone], or via hospital  at [phone]. I can also be reached by Perfect Serve.

## 2022-02-09 NOTE — ED NOTES
TRANSFER - OUT REPORT:    Verbal report given to Renu (name) on Liz Jeter  being transferred to PCU(unit) for routine progression of care       Report consisted of patients Situation, Background, Assessment and   Recommendations(SBAR). Information from the following report(s) SBAR, Kardex, ED Summary, [facility] and Recent Results was reviewed with the receiving nurse. Lines:   Peripheral IV 02/08/22 Right; Anterior Forearm (Active)        Opportunity for questions and clarification was provided.       Patient transported with:   Monitor  Registered Nurse  Tech

## 2022-02-10 NOTE — PROGRESS NOTES
Pulmonary, Critical Care, and Sleep Medicine     Patient Consult    Name: Lyndsey Mccoy MRN: [id number]   : 1929 Hospital: [facility]   Date: 2/10/2022        IMPRESSION:   · Encephalopathy, Increased co2 levels. Rechecked pco2 and decreased to 68. Head Ct was negative for acute changes. Seems a bi better this am. Still with confusion. · Acute on Chronic Respiratory Failure-on NC oxygen when seen this am.   · Has recurrent pleural effusions. · COPD with acute exacerbation. · Stage iv non small cell lung cancer. Mets to bone and pleura. Has RLL collapse. · CHF, increased proBNP. · Felt to have anasarca. Seems a little better today with diuresis from yesterday. · Generalized weakness. · S/p Covid vaccine and booster. · Chronic Right Pleural effusion. Loculated pleural effusion,  Has pulmonary edema. At this point doubt pt would be able to cooperate for a thoracentesis. Doubt it will alleviate much dyspnea given the pt looks comfortable when seen this am. I am worried he has been with moderate chest pain with prior thoracentesis and may have trapped lung. · GERD  · Hx of smoking. · Pt is Do Not Resuscitate and DNI. · Prognosis is guarded. · Critically ill, with multiple organ failure. 35 min CC, EOP. · I discussed with pts daughter who was at bedside this am.   · Discussed with Dr. Verdis Fothergill and Ms. Anil Madrigal this am.   · I called and discussed with Noel Alexander ([phone]) via phone at 140pm. Will continue current course. NO plans for Thoracentesis as this point. RECOMMENDATIONS:   · Bipap as needed for comfort. · ON NC oxygen when seen. · Can use anxiolytics or pain meds as needed. · I would not recommend a thoracentesis at this point. · Would encourage ongoing support with palliative care and consideration of hospice. · IF needed for sedation, I agree haldol would be better than ativan.    · Not able to safely do thoracentesis due to his mental status. · ON diuresis  · ON Zosyn for possible pneumonia. · Will follow. Subjective:   Pt was seen this am. HE appears to be more comfortable when seen this am.   Was on NC Oxygen when seen this am. He will open his eyes but not following any commands. He was very confused last pm. Not able to get further ROS Or HPI from pt. His daughter who was at bedside said he was confused last pm.       This patient has been seen and evaluated at the request of Dr. Delmi Mckeon for above. Patient is a 80 y.o. male who was seen in Dr. Micah Enriquez office. Noted to have worsening fluid retention, worsening hypoxia. Was sent to ER. Per EHR has been worse over the last week. Has severe dyspnea with any exerton. No cough was reported. Noted to have worsening generalized weakness has not been L support himself and is fallen out of his chair several times at home, per his family.        Emergency room  Improved on 2 to 4 L nasal cannula oxygen  Was talking per nursing staff when he arrived, was a bit sleepy  Progressively lethargic, was minimally responsive, non verbal, not following commands when I saw  Head CT negative        Reviewed PAR notes from Dr. Ramon Keen:  First had lung cancer in 2017. He was note tolerating thoracentesis due to pain. May have trapped lung. Had 4 prior thoracentesis. Has been very short of breath on 2/8/2022. Had been starting a new agent per Dr. Nava Olivas. Has new bone metastasis. Has been with increased leg swelling. Desats to 81% on RA. Has been getting worse. Had prior Thoracentesis on 10/2022.        Past Medical History:   Diagnosis Date    Cancer [facility])     Stage 4 Lung Cancer    GERD (gastroesophageal reflux disease)     Lymphedema     Urinary incontinence       Past Surgical History:   Procedure Laterality Date    HX ORTHOPAEDIC Left     Achilles     HX ORTHOPAEDIC Right     knee surery    HX ROTATOR CUFF REPAIR Left       Prior to Admission medications    Medication Sig Start Date End Date Taking? Authorizing Provider   alectinib 150 mg cap Take 4 Capsules by mouth two (2) times a day for 30 days. 22  Kyra Martin MD   alfuzosin SR (UROXATRAL) 10 mg SR tablet  21   Provider, Historical   oxyCODONE IR (ROXICODONE) 5 mg immediate release tablet Take 5 mg by mouth every four (4) hours as needed for Pain. Provider, Historical   senna-docusate (Vegetable Lax-Stool Softener) 8.6-50 mg per tablet Take 1 Tablet by mouth daily. Provider, Historical   acetaminophen/diphenhydramine (TYLENOL PM PO) Take 500 mg by mouth daily. At bedtime    Provider, Historical   esomeprazole magnesium (NEXIUM 24HR PO) Take  by mouth. Provider, Historical     No Known Allergies   Social History     Tobacco Use    Smoking status: Former Smoker     Quit date: 10/5/1991     Years since quittin.3    Smokeless tobacco: Never Used   Substance Use Topics    Alcohol use: Not Currently      History reviewed. No pertinent family history. Current Facility-Administered Medications   Medication Dose Route Frequency    balsam peru-castor oiL (VENELEX) ointment   Topical BID    pantoprazole (PROTONIX) tablet 40 mg  40 mg Oral ACB    sodium chloride (NS) flush 5-40 mL  5-40 mL IntraVENous Q8H    polyethylene glycol (MIRALAX) packet 17 g  17 g Oral DAILY    enoxaparin (LOVENOX) injection 40 mg  40 mg SubCUTAneous DAILY    furosemide (LASIX) injection 40 mg  40 mg IntraVENous BID    aspirin chewable tablet 81 mg  81 mg Oral DAILY    piperacillin-tazobactam (ZOSYN) 3.375 g in 0.9% sodium chloride (MBP/ADV) 100 mL MBP  3.375 g IntraVENous Q8H       Review of Systems:  Review of systems not obtained due to patient factors. with encephalopathy and on bipap.      Objective:   Vital Signs:    Visit Vitals  BP (!) 105/55 (BP 1 Location: Right upper arm, BP Patient Position: At rest)   Pulse 97   Temp 97.5 °F (36.4 °C)   Resp 22   Ht 5' 6\" (1.676 m)   Wt 58.1 kg (128 lb)   SpO2 95%   BMI 20.66 kg/m²       O2 Device: Nasal cannula   O2 Flow Rate (L/min): 3 l/min   Temp (24hrs), Av.2 °F (36.8 °C), Min:97.5 °F (36.4 °C), Max:98.6 °F (37 °C)       Intake/Output:   Last shift:      No intake/output data recorded. Last 3 shifts:  1901 - 02/10 0700  In: 400 [I.V.:400]  Out: 90 [Urine:90]    Intake/Output Summary (Last 24 hours) at 2/10/2022 1139  Last data filed at 2/10/2022 0659  Gross per 24 hour   Intake 300 ml   Output 40 ml   Net 260 ml      Physical Exam:   General:  Sleepy on on NC Oxygen. no distress, appears stated age. Lying in bed, has eyes closed. Head:  Normocephalic, without obvious abnormality, atraumatic. Eyes:  Conjunctivae/corneas clear. PERRL, EOMs intact. Nose: Nares normal. Septum midline. Mucosa normal. No drainage or sinus tenderness. Throat: Lips, mucosa, and tongue normal. Teeth and gums normal.   Neck: Supple, symmetrical, trachea midline, no adenopathy, thyroid: no enlargment/tenderness/nodules, no carotid bruit and no JVD. Back:   Symmetric, no curvature. ROM normal.   Lungs:   Has some decreased BS of the right base. Has good air movement on the left side. Chest wall:  No tenderness or deformity. Heart:  Regular rate and rhythm, S1, S2 normal, no murmur, click, rub or gallop. Abdomen:   Soft, non-tender. Bowel sounds normal. No masses,  No organomegaly. Extremities: Extremities normal, atraumatic, no cyanosis. Has 2+ edema. Pulses: 2+ and symmetric all extremities. Skin: Skin color, texture, turgor normal. No rashes or lesions   Lymph nodes: Cervical, supraclavicular, and axillary nodes normal.   Neurologic: NOt able to assess due to his poor mental status.       Data review:     Recent Results (from the past 24 hour(s))   ECHO ADULT COMPLETE    Collection Time: 22  1:33 PM   Result Value Ref Range    IVSd 1.1 (A) 0.6 - 1.0 cm    LVIDd 3.9 (A) 4.2 - 5.9 cm    LVIDs 3.1 cm    LVOT Diameter 1.9 cm    LVPWd 1.0 0.6 - 1.0 cm    LA Diameter 3.2 cm    PV Peak Gradient 3 mmHg    PV Max Velocity 0.9 m/s    TR Peak Gradient 45 mmHg    TR Max Velocity 3.35 m/s    Ascending Aorta 2.4 cm    Aortic Root 2.8 cm    Fractional Shortening 2D 21 28 - 44 %    LVIDd Index 2.36 cm/m2    LVIDs Index 1.88 cm/m2    LV RWT Ratio 0.51     LV Mass 2D 131.0 88 - 224 g    LV Mass 2D Index 79.4 49 - 115 g/m2    LVOT Area 2.8 cm2    LA Size Index 1.94 cm/m2    LA/AO Root Ratio 1.14     Ao Root Index 1.70 cm/m2    Ascending Aorta Index 1.45 cm/m2   CBC WITH AUTOMATED DIFF    Collection Time: 02/10/22  2:17 AM   Result Value Ref Range    WBC 15.8 (H) 4.1 - 11.1 K/uL    RBC 4.58 4.10 - 5.70 M/uL    HGB 13.2 12.1 - 17.0 g/dL    HCT 42.0 36.6 - 50.3 %    MCV 91.7 80.0 - 99.0 FL    MCH 28.8 26.0 - 34.0 PG    MCHC 31.4 30.0 - 36.5 g/dL    RDW 17.1 (H) 11.5 - 14.5 %    PLATELET 438 526 - 024 K/uL    MPV 9.7 8.9 - 12.9 FL    NRBC 0.0 0  WBC    ABSOLUTE NRBC 0.00 0.00 - 0.01 K/uL    NEUTROPHILS 87 (H) 32 - 75 %    LYMPHOCYTES 2 (L) 12 - 49 %    MONOCYTES 10 5 - 13 %    EOSINOPHILS 0 0 - 7 %    BASOPHILS 0 0 - 1 %    IMMATURE GRANULOCYTES 1 (H) 0.0 - 0.5 %    ABS. NEUTROPHILS 13.7 (H) 1.8 - 8.0 K/UL    ABS. LYMPHOCYTES 0.3 (L) 0.8 - 3.5 K/UL    ABS. MONOCYTES 1.6 (H) 0.0 - 1.0 K/UL    ABS. EOSINOPHILS 0.0 0.0 - 0.4 K/UL    ABS. BASOPHILS 0.0 0.0 - 0.1 K/UL    ABS. IMM.  GRANS. 0.2 (H) 0.00 - 0.04 K/UL    DF SMEAR SCANNED      RBC COMMENTS ANISOCYTOSIS  1+       METABOLIC PANEL, COMPREHENSIVE    Collection Time: 02/10/22  2:17 AM   Result Value Ref Range    Sodium 136 136 - 145 mmol/L    Potassium 4.2 3.5 - 5.1 mmol/L    Chloride 96 (L) 97 - 108 mmol/L    CO2 31 21 - 32 mmol/L    Anion gap 9 5 - 15 mmol/L    Glucose 62 (L) 65 - 100 mg/dL    BUN 36 (H) 6 - 20 MG/DL    Creatinine 1.67 (H) 0.70 - 1.30 MG/DL    BUN/Creatinine ratio 22 (H) 12 - 20      GFR est AA 47 (L) >60 ml/min/1.73m2    GFR est non-AA 39 (L) >60 ml/min/1.73m2    Calcium 8.4 (L) 8.5 - 10.1 MG/DL    Bilirubin, total 0.5 0.2 - 1.0 MG/DL    ALT (SGPT) 20 12 - 78 U/L    AST (SGOT) 26 15 - 37 U/L    Alk. phosphatase 149 (H) 45 - 117 U/L    Protein, total 5.4 (L) 6.4 - 8.2 g/dL    Albumin 2.5 (L) 3.5 - 5.0 g/dL    Globulin 2.9 2.0 - 4.0 g/dL    A-G Ratio 0.9 (L) 1.1 - 2.2     MAGNESIUM    Collection Time: 02/10/22  2:17 AM   Result Value Ref Range    Magnesium 2.0 1.6 - 2.4 mg/dL   PHOSPHORUS    Collection Time: 02/10/22  2:17 AM   Result Value Ref Range    Phosphorus 6.0 (H) 2.6 - 4.7 MG/DL   POC G3 - PUL    Collection Time: 02/10/22  5:38 AM   Result Value Ref Range    FIO2 (POC) 30 %    pH (POC) 7.29 (L) 7.35 - 7.45      pCO2 (POC) 65.2 (H) 35.0 - 45.0 MMHG    pO2 (POC) 91 80 - 100 MMHG    HCO3 (POC) 31.5 (H) 22 - 26 MMOL/L    sO2 (POC) 95.6 92 - 97 %    Base excess (POC) 3.1 mmol/L    Site LEFT BRACHIAL      Device: BIPAP MASK      Mode Non Invasive      PEEP/CPAP (POC) 5 cmH2O    PIP (POC) 15      Pressure support 14 cmH2O    Allens test (POC) NOT APPLICABLE      Specimen type (POC) ARTERIAL         Imaging:  I have personally reviewed the patients radiographs and have reviewed the reports:  2-8-2022: FINDINGS:     CHEST WALL: No mass or axillary lymphadenopathy. THYROID: No nodule. MEDIASTINUM: No mass or lymphadenopathy. KEY: No mass or lymphadenopathy. THORACIC AORTA: No aneurysm. MAIN PULMONARY ARTERY: Normal in caliber. TRACHEA/BRONCHI: Cut off sign of right mainstem bronchus (axial image 33). ESOPHAGUS: No wall thickening or dilatation. HEART: Normal in size. Coronary artery calcification  PLEURA: Right basilar effusion loculated with air, loculated at lung base and  posteriorly. Small left pleural effusion. LUNGS: Multifocal airspace disease throughout the entire right lung. Right lower  lobe is collapsed. INCIDENTALLY IMAGED UPPER ABDOMEN: Chronic granulomata. Exophytic 2 cm left  renal cyst.  BONES: Right scapular. 8 mm sclerotic focus (sagittal image 18).  Sclerotic focus  in the anterior aspect of the T10 vertebra (sagittal image 74).    IMPRESSION  Cut off sign of the right mainstem bronchus in association with right lower lobe  collapse and multifocal airspace disease in the right upper lobe and right  middle lobe. Associated loculated pleural effusion     Indeterminate sclerotic focus in the T10 vertebra. Recommend correlation with  bone scan.     Incidental small left pleural effusion with left basilar subsegmental  atelectasis and coronary artery calcification.         Nancy Delaney MD

## 2022-02-10 NOTE — PROGRESS NOTES
Palliative Medicine Consult  Nishant: [phone] ([phone])    Patient Name: Janet Hopkins  YOB: 1929    Date of Initial Consult: 2/8/22  Reason for Consult: Care Decisions  Requesting Provider: Breanna Barba MD  Primary Care Physician: Emmie Sullivan MD     SUMMARY:   Janet Hopkins is a 80 y.o. with a past history of stage IV cancer with mets to bone and pleura, diagnosed 5 years ago, chronic pleural effusion with periodic thoracentesis (last on was Oct 2021), who was admitted on 2/8/2022 from home (via the OP Pulmonary clinic) with a diagnosis of acute metabolic encephalopathy due to hypercarbia, 2/2 acute on chronic respiratory failure with hypoxia and hypercarbia due to CHF, COPD, and possible post obstructive RLL PNA in the setting of lung cancer. Current medical issues leading to Palliative Medicine involvement include: goals of care in the setting of frailty and worsening respiratory status     PALLIATIVE DIAGNOSES:   1. Goals of care  2. Delirium  3. Restlessness  4. Palliative Encounter     PLAN:   1. Mr Jake Barron is off bipap now, much more responsive in that he answered some simple questions for me, and occasionally would yell out, but in between is sleeping, and there is not an active conversation  2. I had a long talk with both Cleo Reilly and Hali Lindsay (did not speak with Delamadeo Creswell as Cleo Tommie shared that she had been here all night and was probably sleeping)  3. They are both surprised and encouraged that Mr Jake Barron survived the night and is doing so much better- they were very concerned that he was going to pass away last night. 4. They both talked a lot about the Thoracentesis, as they feel that it will contribute greatly to his comfort and functional level. I shared that at this point, given that he is comfortable already, it was probably not the best idea to put him through an invasive procedure that historically has caused severe pain   5.  I introduced the idea of hospice support, as today he is at a place where he could leave the hospital with their support as he is not on an extraordinary amount of oxygen. 6. Neither of these two daughters are ready to make that decision yet, and Briseyda Gomes prefers to work towards the goal that maybe next week he can get up with PT/OT and then have the thoracentesis  7. Have asked CM to follow up with them, family wants to know if [place] will work with them, if he needs extra support, or if he will need to go to a SNF for rehab  8. I will continue to follow closely over the next few days for support as he is high risk for decline  9. Initial consult note routed to primary continuity provider and/or primary health care team members  10. Communicated plan of care with: Palliative IDTDonald 192 Team     GOALS OF CARE / TREATMENT PREFERENCES:     GOALS OF CARE:  Patient/Health Care Proxy Stated Goals: Comfort    TREATMENT PREFERENCES:   Code Status: DNR    Advance Care Planning:  [x] [facility] Interdisciplinary Team has updated the ACP Navigator with Health Care Decision Maker and Patient Capacity      Primary Decision Maker (Active): Ludmila Patela - Daughter - [phone]    Primary Decision Maker: Karen Puentes - Daughter - [phone]   Along with their sister Briseyda Gomes    No flowsheet data found. Medical Interventions: Limited additional interventions       Other:    As far as possible, the palliative care team has discussed with patient / health care proxy about goals of care / treatment preferences for patient.      HISTORY:     History obtained from: chart, daughter    CHIEF COMPLAINT: none    HPI/SUBJECTIVE:    The patient is:   [] Verbal and participatory  [x] Non-participatory due to:   Condition    Clinical Pain Assessment (nonverbal scale for severity on nonverbal patients):   Clinical Pain Assessment  Severity: 0     Activity (Movement): Lying quietly, normal position    Duration: for how long has pt been experiencing pain (e.g., 2 days, 1 month, years)  Frequency: how often pain is an issue (e.g., several times per day, once every few days, constant)     FUNCTIONAL ASSESSMENT:     Palliative Performance Scale (PPS):  PPS: 20       PSYCHOSOCIAL/SPIRITUAL SCREENING:     Palliative IDT has assessed this patient for cultural preferences / practices and a referral made as appropriate to needs (Cultural Services, Patient Advocacy, Ethics, etc.)    Any spiritual / Jew concerns:  [] Yes /  [x] No   If \"Yes\" to discuss with pastoral care during IDT     Does caregiver feel burdened by caring for their loved one:   [] Yes /  [x] No /  [] No Caregiver Present    If \"Yes\" to discuss with social work during IDT    Anticipatory grief assessment:   [x] Normal  / [] Maladaptive     If \"Maladaptive\" to discuss with social work during IDT    ESAS Anxiety: Anxiety: 0    ESAS Depression: Depression: 0        REVIEW OF SYSTEMS:     Positive and pertinent negative findings in ROS are noted above in HPI. The following systems were [x] reviewed / [] unable to be reviewed as noted in HPI  Other findings are noted below. Systems: constitutional, ears/nose/mouth/throat, respiratory, gastrointestinal, genitourinary, musculoskeletal, integumentary, neurologic, psychiatric, endocrine. Positive findings noted below. Modified ESAS Completed by: provider   Fatigue: 8     Depression: 0 Pain: 0   Anxiety: 0 Nausea: 0   Anorexia: 10 Dyspnea: 0                    PHYSICAL EXAM:     From RN flowsheet:  Wt Readings from Last 3 Encounters:   02/09/22 128 lb (58.1 kg)   01/04/22 133 lb (60.3 kg)   10/21/21 120 lb (54.4 kg)     Blood pressure (!) 105/55, pulse 97, temperature 97.5 °F (36.4 °C), resp. rate 22, height 5' 6\" (1.676 m), weight 128 lb (58.1 kg), SpO2 95 %.     Pain Scale 1: Numeric (0 - 10)  Pain Intensity 1: 0                 Last bowel movement, if known:     Constitutional: very lethargic but will wake to stimulation  Cardiovascular: regular rhythm, distal pulses intact  Respiratory: breathing not labored  Musculoskeletal: no deformity, no tenderness to palpation  Skin: warm, dry  Other:       HISTORY:     Active Problems:    Acute left-sided CHF (congestive heart failure) (HCC) (2022)      Goals of care, counseling/discussion ()      Obtunded ()      Delirium ()      Restlessness ()      Palliative care by specialist ()      Past Medical History:   Diagnosis Date    Cancer ([facility] [address]     Stage 4 Lung Cancer    GERD (gastroesophageal reflux disease)     Lymphedema     Urinary incontinence       Past Surgical History:   Procedure Laterality Date    HX ORTHOPAEDIC Left     Achilles     HX ORTHOPAEDIC Right     knee surery    HX ROTATOR CUFF REPAIR Left       History reviewed. No pertinent family history. History reviewed, no pertinent family history.   Social History     Tobacco Use    Smoking status: Former Smoker     Quit date: 10/5/1991     Years since quittin.3    Smokeless tobacco: Never Used   Substance Use Topics    Alcohol use: Not Currently     No Known Allergies   Current Facility-Administered Medications   Medication Dose Route Frequency    albuterol-ipratropium (DUO-NEB) 2.5 MG-0.5 MG/3 ML  3 mL Nebulization Q4H PRN    balsam peru-castor oiL (VENELEX) ointment   Topical BID    pantoprazole (PROTONIX) tablet 40 mg  40 mg Oral ACB    haloperidol lactate (HALDOL) injection 1 mg  1 mg IntraVENous Q6H PRN    sodium chloride (NS) flush 5-40 mL  5-40 mL IntraVENous Q8H    sodium chloride (NS) flush 5-40 mL  5-40 mL IntraVENous PRN    acetaminophen (TYLENOL) tablet 650 mg  650 mg Oral Q6H PRN    Or    acetaminophen (TYLENOL) suppository 650 mg  650 mg Rectal Q6H PRN    polyethylene glycol (MIRALAX) packet 17 g  17 g Oral DAILY    bisacodyL (DULCOLAX) tablet 5 mg  5 mg Oral DAILY PRN    promethazine (PHENERGAN) tablet 12.5 mg  12.5 mg Oral Q6H PRN    Or    ondansetron (ZOFRAN) injection 4 mg  4 mg IntraVENous Q6H PRN    enoxaparin (LOVENOX) injection 40 mg  40 mg SubCUTAneous DAILY    furosemide (LASIX) injection 40 mg  40 mg IntraVENous BID    aspirin chewable tablet 81 mg  81 mg Oral DAILY    piperacillin-tazobactam (ZOSYN) 3.375 g in 0.9% sodium chloride (MBP/ADV) 100 mL MBP  3.375 g IntraVENous Q8H          LAB AND IMAGING FINDINGS:     Lab Results   Component Value Date/Time    WBC 15.8 (H) 02/10/2022 02:17 AM    HGB 13.2 02/10/2022 02:17 AM    PLATELET 839 14/33/3493 02:17 AM     Lab Results   Component Value Date/Time    Sodium 136 02/10/2022 02:17 AM    Potassium 4.2 02/10/2022 02:17 AM    Chloride 96 (L) 02/10/2022 02:17 AM    CO2 31 02/10/2022 02:17 AM    BUN 36 (H) 02/10/2022 02:17 AM    Creatinine 1.67 (H) 02/10/2022 02:17 AM    Calcium 8.4 (L) 02/10/2022 02:17 AM    Magnesium 2.0 02/10/2022 02:17 AM    Phosphorus 6.0 (H) 02/10/2022 02:17 AM      Lab Results   Component Value Date/Time    Alk. phosphatase 149 (H) 02/10/2022 02:17 AM    Protein, total 5.4 (L) 02/10/2022 02:17 AM    Albumin 2.5 (L) 02/10/2022 02:17 AM    Globulin 2.9 02/10/2022 02:17 AM     No results found for: INR, PTMR, PTP, PT1, PT2, APTT, INREXT, INREXT   No results found for: IRON, FE, TIBC, IBCT, PSAT, FERR   Lab Results   Component Value Date/Time    pH 7.28 (L) 02/09/2022 11:13 AM    PCO2 69 (H) 02/09/2022 11:13 AM    PO2 93 02/09/2022 11:13 AM     No components found for: GLPOC   No results found for: CPK, CKMB             Total time: 35m  Counseling / coordination time, spent as noted above: 30m  > 50% counseling / coordination?: y    Prolonged service was provided for  []30 min   []75 min in face to face time in the presence of the patient, spent as noted above. Time Start:   Time End:   Note: this can only be billed with 92776 (initial) or 84690 (follow up). If multiple start / stop times, list each separately.

## 2022-02-10 NOTE — PROGRESS NOTES
SPEECH PATHOLOGY BEDSIDE SWALLOW EVALUATION/DISCHARGE  Patient: Freddie Mayfield (45 y.o. male)  Date: 2/10/2022  Primary Diagnosis: Acute left-sided CHF (congestive heart failure) ([facility] [I50.1]       Precautions:       ASSESSMENT :  Based on the objective data described below, the patient presents with a grossly functional oropharyngeal swallow. RN reported difficulty with thin liquids on this date with concern for aspiration. On this date, patient tolerated puree and solid trials without difficulty. Patient observed with intermittent coughing following rapid, sequential sips of thin and mildly thick liquid. Patient able to expectorate pharyngeal secretions/mucous. Coughing was not reproducible during single sips of thin liquid. Given patient's history of CHF, COPD, and pulmonary status, patient is at risk for aspiration. Note patient's goals for comfort per chart review. Skilled acute therapy provided by a speech-language pathologist is not indicated at this time. Consider Regular/Thin Liquids (alter diet as needed to respect patient's wishes) and medication whole in puree. RN educated re: SLP evaluation. PLAN :  Recommendations:  -- Regular/Thin Liquid  -- Medication Whole in Puree  -- Single Sips (1 at a Time)  -- Small Bites/Sips  -- Sitting Upright    Discharge Recommendations: None     SUBJECTIVE:   Patient stated Jose Eusebio when told happy belated birthday.     OBJECTIVE:     Past Medical History:   Diagnosis Date    Cancer ([facility]     Stage 4 Lung Cancer    GERD (gastroesophageal reflux disease)     Lymphedema     Urinary incontinence      Past Surgical History:   Procedure Laterality Date    HX ORTHOPAEDIC Left     Achilles     HX ORTHOPAEDIC Right     knee surery    HX ROTATOR CUFF REPAIR Left      Prior Level of Function/Home Situation:        Diet prior to admission: Regular/Thin Liquid  Current Diet: Regular/Thin Liquid    Cognitive and Communication Status:  Neurologic State: Alert  Orientation Level: Oriented to person,Oriented to place,Oriented to situation,Disoriented to time  Cognition: Follows commands     Perseveration: No perseveration noted  Safety/Judgement: Awareness of environment    Oral Assessment:  Oral Assessment  Labial: No impairment  Dentition: Full;Natural  Oral Hygiene: Dry oral mucosa  Lingual: No impairment  Velum: No impairment  Mandible: No impairment    P.O. Trials:  Patient Position: Upright in bed  Vocal quality prior to P.O.: No impairment  Consistency Presented: Thin liquid; Nectar thick liquid;Puree; Solid  How Presented: Self-fed/presented;Cup/sip;Straw;Successive swallows;Spoon     Bolus Acceptance: No impairment  Bolus Formation/Control: No impairment     Propulsion: No impairment  Oral Residue: None  Initiation of Swallow: No impairment  Laryngeal Elevation: Functional  Aspiration Signs/Symptoms: Strong cough (Cough observed with sequential sips of thin and mildly thick liquids)  Pharyngeal Phase Characteristics: Double swallow                   NOMS:   The NOMS functional outcome measure was used to quantify this patient's level of swallowing impairment. Based on the NOMS, the patient was determined to be at level 7 for swallow function. NOMS Swallowing Levels:  Level 1 (CN): NPO  Level 2 (CM): NPO but takes consistency in therapy  Level 3 (CL): Takes less than 50% of nutrition p.o. and continues with nonoral feedings; and/or safe with mod cues; and/or max diet restriction  Level 4 (CK): Safe swallow but needs mod cues; and/or mod diet restriction; and/or still requires some nonoral feeding/supplements  Level 5 (CJ): Safe swallow with min diet restriction; and/or needs min cues  Level 6 (CI): Independent with p.o.; rare cues; usually self cues; may need to avoid some foods or needs extra time  Level 7 ([address] [place] [address]): Independent for all p.o.  JENNIFER. (2003). National Outcomes Measurement System (NOMS): Adult Speech-Language Pathology User's Guide.      Pain:  Pain Scale 1: Numeric (0 - 10)  Pain Intensity 1: 0       After treatment:   Patient left in no apparent distress in bed, Call bell within reach, Nursing notified and Caregiver / family present    COMMUNICATION/EDUCATION:   The patient's plan of care including recommendations, planned interventions, and recommended diet changes were discussed with: Registered nurse.      Thank you for this referral.  WILLA Aldana  Time Calculation: 15 mins

## 2022-02-10 NOTE — PROGRESS NOTES
INTERNAL MEDICINE ATTENDING NOTE     Patient Name: Marshall Umaña   : 1929   Admit: 2022    ASSESSMENT / PLAN    1. Acute on chronic respiratory failure, with hypoxia and hypercarbia POA: Off BiPAP now. Pulmonology following--appreciate assistance. 2. Acute left-sided CHF (congestive heart failure) ([address] (2022): Diuresis. 3. Possible post-obstructive pneumonia: On zosyn. However, normal procalcitonin. 4. Metabolic encephalopathy: Head CT negative. Manage respiratory failure. 5. COPD: Underlying/POA. 6. 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 Recently started a new chemotherapy with alectinib. 7. GERD: PPI. 8. DVT ppx with lovenox. 9. DNR/DNI. Palliative care notes reviewed. Daughter states \"we just want him comfortable. \"     I have discussed his case with his daughter today, as well as with Dr. Brittany Coello. SUBJECTIVE: Mr. Marshall Umaña was seen by me today during rounds. At this time, he is asleep but rousable. I spoke with his daughter who states that he has had episodes of being agitated, thrashing about and calling out things like, \"change the oil! \" and \"my penis is stuck. \" He has at other times seemed comfortable. ROS not obtainable from patient. OBJECTIVE: Blood pressure 119/60, pulse 100, temperature 98.2 °F (36.8 °C), resp. rate 18, height 5' 6\" (1.676 m), weight 128 lb (58.1 kg), SpO2 97 %. Gen: Patient is in no acute distress. Lungs: Coarse upper airway sounds. Pt is on oxygen by nasal cannula; off BiPAP. Heart: RRR. Abd: S, NT, ND, BS present. Exremities: Warm.      Recent Results (from the past 12 hour(s))   CBC WITH AUTOMATED DIFF    Collection Time: 02/10/22  2:17 AM   Result Value Ref Range    WBC 15.8 (H) 4.1 - 11.1 K/uL    RBC 4.58 4.10 - 5.70 M/uL    HGB 13.2 12.1 - 17.0 g/dL    HCT 42.0 36.6 - 50.3 %    MCV 91.7 80.0 - 99.0 FL    MCH 28.8 26.0 - 34.0 PG    MCHC 31.4 30.0 - 36.5 g/dL    RDW 17.1 (H) 11.5 - 14.5 %    PLATELET 606 323 - 638 K/uL    MPV 9.7 8.9 - 12.9 FL    NRBC 0.0 0  WBC    ABSOLUTE NRBC 0.00 0.00 - 0.01 K/uL    NEUTROPHILS 87 (H) 32 - 75 %    LYMPHOCYTES 2 (L) 12 - 49 %    MONOCYTES 10 5 - 13 %    EOSINOPHILS 0 0 - 7 %    BASOPHILS 0 0 - 1 %    IMMATURE GRANULOCYTES 1 (H) 0.0 - 0.5 %    ABS. NEUTROPHILS 13.7 (H) 1.8 - 8.0 K/UL    ABS. LYMPHOCYTES 0.3 (L) 0.8 - 3.5 K/UL    ABS. MONOCYTES 1.6 (H) 0.0 - 1.0 K/UL    ABS. EOSINOPHILS 0.0 0.0 - 0.4 K/UL    ABS. BASOPHILS 0.0 0.0 - 0.1 K/UL    ABS. IMM. GRANS. 0.2 (H) 0.00 - 0.04 K/UL    DF SMEAR SCANNED      RBC COMMENTS ANISOCYTOSIS  1+       METABOLIC PANEL, COMPREHENSIVE    Collection Time: 02/10/22  2:17 AM   Result Value Ref Range    Sodium 136 136 - 145 mmol/L    Potassium 4.2 3.5 - 5.1 mmol/L    Chloride 96 (L) 97 - 108 mmol/L    CO2 31 21 - 32 mmol/L    Anion gap 9 5 - 15 mmol/L    Glucose 62 (L) 65 - 100 mg/dL    BUN 36 (H) 6 - 20 MG/DL    Creatinine 1.67 (H) 0.70 - 1.30 MG/DL    BUN/Creatinine ratio 22 (H) 12 - 20      GFR est AA 47 (L) >60 ml/min/1.73m2    GFR est non-AA 39 (L) >60 ml/min/1.73m2    Calcium 8.4 (L) 8.5 - 10.1 MG/DL    Bilirubin, total 0.5 0.2 - 1.0 MG/DL    ALT (SGPT) 20 12 - 78 U/L    AST (SGOT) 26 15 - 37 U/L    Alk.  phosphatase 149 (H) 45 - 117 U/L    Protein, total 5.4 (L) 6.4 - 8.2 g/dL    Albumin 2.5 (L) 3.5 - 5.0 g/dL    Globulin 2.9 2.0 - 4.0 g/dL    A-G Ratio 0.9 (L) 1.1 - 2.2     MAGNESIUM    Collection Time: 02/10/22  2:17 AM   Result Value Ref Range    Magnesium 2.0 1.6 - 2.4 mg/dL   PHOSPHORUS    Collection Time: 02/10/22  2:17 AM   Result Value Ref Range    Phosphorus 6.0 (H) 2.6 - 4.7 MG/DL   POC G3 - PUL    Collection Time: 02/10/22  5:38 AM   Result Value Ref Range    FIO2 (POC) 30 %    pH (POC) 7.29 (L) 7.35 - 7.45      pCO2 (POC) 65.2 (H) 35.0 - 45.0 MMHG    pO2 (POC) 91 80 - 100 MMHG    HCO3 (POC) 31.5 (H) 22 - 26 MMOL/L    sO2 (POC) 95.6 92 - 97 %    Base excess (POC) 3.1 mmol/L    Site LEFT BRACHIAL      Device: BIPAP MASK      Mode Non Invasive      PEEP/CPAP (POC) 5 cmH2O    PIP (POC) 15      Pressure support 14 cmH2O    Allens test (POC) NOT APPLICABLE      Specimen type (POC) ARTERIAL          CT chest WO contrast:  Cut off sign of the right mainstem bronchus in association with right lower lobe  collapse and multifocal airspace disease in the right upper lobe and right  middle lobe. Associated loculated pleural effusion     Indeterminate sclerotic focus in the T10 vertebra. Recommend correlation with  bone scan.     Incidental small left pleural effusion with left basilar subsegmental  atelectasis and coronary artery calcification. CT head WO contrast:   No acute process seen     CXR:   New pulmonary edema. New right pleural effusion. A superimposed  infiltrate cannot be excluded    Ric Meeks MD, FACP  Best contact is via Pager: [phone], or via hospital  at [phone]. I can also be reached by Perfect Serve.

## 2022-02-10 NOTE — PROGRESS NOTES
Spiritual Care Assessment/Progress Note  [facility]      NAME: Hiral Joiner      MRN: [id number]  AGE: 80 y.o. SEX: male  Taoism Affiliation: Unknown   Language: English     2/10/2022     Total Time (in minutes): 12     Spiritual Assessment begun in [facility] PROGRESSIVE CARE through conversation with:         []Patient        [x] Family    [] Friend(s)        Reason for Consult: Palliative Care, Initial/Spiritual Assessment     Spiritual beliefs: (Please include comment if needed)     [x] Identifies with a isis tradition:    Temple/Tenriism     [] Supported by a isis community:            [] Claims no spiritual orientation:           [] Seeking spiritual identity:                [] Adheres to an individual form of spirituality:           [] Not able to assess:                           Identified resources for coping:      [] Prayer                               [] Music                  [] Guided Imagery     [x] Family/friends                 [] Pet visits     [] Devotional reading                         [] Unknown     [x] Other: Communion                                         Interventions offered during this visit: (See comments for more details)    Patient Interventions: Initial visit     Family/Friend(s):  Affirmation of emotions/emotional suffering,Affirmation of isis,Catharsis/review of pertinent events in supportive environment,Iconic (affirming the presence of God/Higher Power),Normalization of emotional/spiritual concerns,Taoism beliefs/image of God discussed     Plan of Care:     [x] Support spiritual and/or cultural needs    [] Support AMD and/or advance care planning process      [] Support grieving process   [] Coordinate Rites and/or Rituals    [x] Coordination with community clergy   [] No spiritual needs identified at this time   [] Detailed Plan of Care below (See Comments)  [] Make referral to Music Therapy  [] Make referral to Pet Therapy     [] Make referral to Addiction services  [] Make referral to [facility]  [] Make referral to Spiritual Care Partner  [] No future visits requested        [x] Contact Spiritual Care for further referrals     Comments:  Reviewed chart prior to visit with patient on PCU. Patient was sleeping;  did not attempt to wake him. Telephoned his daughter, Orin Gamez, to assess any spiritual or Scientology needs he or the family may want. Orin Gamez mentioned that her father would most likely want to receive Holy Communion. Chaplains will continue to follow him for the opportunity to offer Communion.    available upon referral by nurse or by family request.         GILLIAN Fraga, [facility], Staff   FREEMAN LIU [facility] Paging Service  [phone])

## 2022-02-10 NOTE — PROGRESS NOTES
End of Shift Note    Bedside shift change report given to Edward Humphries RN (oncoming nurse) by Jerry Gutierres RN (offgoing nurse). Report included the following information SBAR    Shift worked:  7p-7a     Shift summary and any significant changes:    Patient alert but not oriented, speech is garbled, and periodically pull off bipap. Continue to educate patient on why need to wear bipap. 0340: pulled haldol to give patient due to continuously pulling off bipap when I entered the room the patient's daughter present at bedside. Did not give haldol    0439: PRN haldol given for increased agitation    Patient pressures running low SBP: 90's    0550: BIPAP off patient not tolerating. Continues to yell out help and pull off bipap. ABG: CO2 65.2   Nasal cannula 3L place O2: 98%    0630: 22g IV placed right forearm. Patient seems to be aspirating liquids. Thicken liquid before given. Patient daughter in room feeding patient thickened orange juice     Concerns for physician to address:      Zone phone for oncoming shift:          Activity:  Activity Level: Bed Rest  Number times ambulated in hallways past shift: 0  Number of times OOB to chair past shift: 0    Cardiac:   Cardiac Monitoring: Yes      Cardiac Rhythm: Atrial Fib    Access:   Current line(s): PIV     Genitourinary:   Urinary status: incontinent    Respiratory:   O2 Device: BIPAP  Chronic home O2 use?: NO  Incentive spirometer at bedside: NO     GI:     Current diet:  ADULT DIET Regular; Low Fat/Low Chol/High Fiber/2 gm Na  ADULT ORAL NUTRITION SUPPLEMENT Breakfast, Dinner; Standard High Calorie/High Protein    Tolerating current diet: YES       Pain Management:   Patient states pain is manageable on current regimen: YES    Skin:  Roberto Carlos Score: 11  Interventions: turn team, speciality bed, float heels, increase time out of bed, foam dressing, PT/OT consult, limit briefs and internal/external urinary devices    Patient Safety:  Fall Score:  Total Score: 4  Interventions: bed/chair alarm, assistive device (walker, cane, etc), gripper socks, pt to call before getting OOB and stay with me (per policy)  High Fall Risk: Yes    Length of Stay:  Expected LOS: 3d 14h  Actual LOS: 2      Angela Capone, RN

## 2022-02-10 NOTE — PROGRESS NOTES
Chart reviewed. Pt does not seem appropriate for PT at this time if the goal is to make pt as comfortable as possible. Noted palliative very involved and possible consideration for hospice. Will sign off at this time. If there is a change and the plan is for more agressive care/rehab can be re-consulted.

## 2022-02-10 NOTE — PROGRESS NOTES
Transition of Care Plan:    RUR:17%    Disposition: Assisted Living vs snf    Follow up appointments: To be made prior to discharge. DME needed: To be determined. Transportation at Discharge: Daughter    [address] or means to access home:  [address]        IM Medicare Letter: To be given prior to discharge. Is patient a BCPI-A Bundle: No            If yes, was Bundle Letter given?: N/A     Is patient a [place] and connected with the [place]? No                If yes, was [employer] transfer form completed and VA notified? N/A    Caregiver Contact:Daughter    Discharge Caregiver contacted prior to discharge? Caregiver to be contacted prior to discharge. Care Conference needed?: Not at this time. Reason for Admission: Patient came to ed for sob. PMHX significant for gerd, lung cancer stage four, and lymphadema. RUR Score:   17%               PCP: First and Last name:   John Reed MD     Name of Practice: [facility]     Are you a current patient: Yes/No: Yes     Approximate date of last visit: October 2021     Can you participate in a virtual visit if needed: No      Do you (patient/family) have any concerns for transition/discharge?    No                  Plan for utilizing home health:   Patient has not used inpatient rehab or home health in the past.    Current Advanced Directive/Advance Care Plan:  DNR    Advance Care Planning     General Advance Care Planning (ACP) Conversation      Date of Conversation: 2/10/22  Conducted with: Patient with Decision Making Capacity and Healthcare Decision Maker: Next of Kin by law (only applies in absence of a Healthcare Power of  or Legal Guardian)    Healthcare Decision Maker:     Primary Decision Maker (Active): Evette Yung - Daughter - [phone]    Primary Decision Maker: Vitor Arnold - Daughter - [phone]  Click here to [address] including selection of the Healthcare Decision Maker Relationship (ie \"Primary\")          Content/Action Overview:   DECLINED ACP conversation - will revisit periodically   Reviewed DNR/DNI and patient confirms current DNR status - completed forms on file (place new order if needed)         Length of Voluntary ACP Conversation in minutes:  <16 minutes (Non-Billable)    Mehdi Puga RN               Healthcare Decision Maker:   Click here to complete [address] including selection of the [address] Relationship (ie \"Primary\")            Primary Decision Maker (Active): Stu Pereira - Daughter - [phone]    Primary Decision Maker: Barbara Caitde - Daughter - [phone]     Patient recovering in the pcu on the bipap. Spoke with his daughter Luiza Cespedes) and she verified demographics and pcp information. Patient lives at the [facility] in the assisted living part. She states he has been there since August. Prior to coming to the hospital patient was ambulating and independent. He would use a walker occasionally. He was not using home oxygen or cpap. He has started getting weaker and she would like when appropriate for him to have therapy and possible rehab. Yulissa Robison RN BSN CRM        [phone]

## 2022-02-10 NOTE — PROGRESS NOTES
Problem: Chronic Obstructive Pulmonary Disease (COPD)  Goal: *Oxygen saturation during activity within specified parameters  Outcome: Progressing Towards Goal     Problem: Pressure Injury - Risk of  Goal: *Prevention of pressure injury  Description: Document Roberto Carlos Scale and appropriate interventions in the flowsheet. Outcome: Progressing Towards Goal  Note: Pressure Injury Interventions:  Sensory Interventions: Assess changes in LOC,Assess need for specialty bed,Avoid rigorous massage over bony prominences,Check visual cues for pain,Discuss PT/OT consult with provider,Float heels,Keep linens dry and wrinkle-free,Maintain/enhance activity level,Minimize linen layers,Monitor skin under medical devices,Pad between skin to skin,Turn and reposition approx. every two hours (pillows and wedges if needed)    Moisture Interventions: Absorbent underpads,Apply protective barrier, creams and emollients,Check for incontinence Q2 hours and as needed,Internal/External urinary devices,Minimize layers    Activity Interventions: Assess need for specialty bed,Increase time out of bed,Pressure redistribution bed/mattress(bed type)    Mobility Interventions: Assess need for specialty bed,Float heels,Pressure redistribution bed/mattress (bed type),Turn and reposition approx. every two hours(pillow and wedges)    Nutrition Interventions: Document food/fluid/supplement intake,Discuss nutritional consult with provider    Friction and Shear Interventions: Apply protective barrier, creams and emollients,Lift sheet,Lift team/patient mobility team,Minimize layers,Foam dressings/transparent film/skin sealants                Problem: Falls - Risk of  Goal: *Absence of Falls  Description: Document Gladys Fall Risk and appropriate interventions in the flowsheet.   Outcome: Progressing Towards Goal  Note: Fall Risk Interventions:  Mobility Interventions: Bed/chair exit alarm,Communicate number of staff needed for ambulation/transfer,OT consult for ADLs,Patient to call before getting OOB,PT Consult for mobility concerns,PT Consult for assist device competence,Strengthening exercises (ROM-active/passive)    Mentation Interventions: Adequate sleep, hydration, pain control,Bed/chair exit alarm,Evaluate medications/consider consulting pharmacy,Eyeglasses and hearing aids,Familiar objects from home,More frequent rounding,Increase mobility,Reorient patient,Room close to nurse's station,Toileting rounds,Update white board    Medication Interventions: Bed/chair exit alarm,Patient to call before getting OOB,Teach patient to arise slowly    Elimination Interventions: Bed/chair exit alarm,Call light in reach,Patient to call for help with toileting needs,Stay With Me (per policy),Toilet paper/wipes in reach,Toileting schedule/hourly rounds

## 2022-02-11 NOTE — PROGRESS NOTES
responded to death of patient, Mr. Richmond Pitts in room 2262. Patient's daughter Negra De La Fuente was present in his room. She seemed in good spirit and shared that her isis gives her hope that patient was in a good place and free from all the ailments and sufferings of life. She engaged in life review ans shared about her family and her isis. She stated that her father was a good man and a great father. She feel privileged to be present at this transitioning. She has a supportive family as well. Oma asked for prayer of thanksgiving for father's safe transition, which  offered. She thanked  for the visit and prayer. Visited by: Emily Ferreira.    Paging Service: [phone])

## 2022-02-11 NOTE — PROGRESS NOTES
Problem: Chronic Obstructive Pulmonary Disease (COPD)  Goal: *Oxygen saturation during activity within specified parameters  Outcome: Progressing Towards Goal     Problem: Pressure Injury - Risk of  Goal: *Prevention of pressure injury  Description: Document Roberto Carlos Scale and appropriate interventions in the flowsheet. Outcome: Progressing Towards Goal  Note: Pressure Injury Interventions:  Sensory Interventions: Assess changes in LOC,Assess need for specialty bed,Avoid rigorous massage over bony prominences,Check visual cues for pain,Discuss PT/OT consult with provider,Float heels,Keep linens dry and wrinkle-free,Maintain/enhance activity level,Minimize linen layers,Monitor skin under medical devices,Pad between skin to skin,Turn and reposition approx. every two hours (pillows and wedges if needed)    Moisture Interventions: Absorbent underpads,Apply protective barrier, creams and emollients,Check for incontinence Q2 hours and as needed,Internal/External urinary devices,Limit adult briefs,Maintain skin hydration (lotion/cream),Minimize layers,Moisture barrier    Activity Interventions: Pressure redistribution bed/mattress(bed type),PT/OT evaluation    Mobility Interventions: Float heels,HOB 30 degrees or less,Pressure redistribution bed/mattress (bed type),PT/OT evaluation,Turn and reposition approx. every two hours(pillow and wedges)    Nutrition Interventions: Offer support with meals,snacks and hydration,Document food/fluid/supplement intake,Discuss nutritional consult with provider    Friction and Shear Interventions: Foam dressings/transparent film/skin sealants,HOB 30 degrees or less,Lift sheet,Lift team/patient mobility team,Apply protective barrier, creams and emollients,Minimize layers                Problem: Falls - Risk of  Goal: *Absence of Falls  Description: Document Gladys Fall Risk and appropriate interventions in the flowsheet.   Outcome: Progressing Towards Goal  Note: Fall Risk Interventions:  Mobility Interventions: Bed/chair exit alarm,Communicate number of staff needed for ambulation/transfer,Patient to call before getting OOB,PT Consult for mobility concerns,PT Consult for assist device competence    Mentation Interventions: Bed/chair exit alarm,Door open when patient unattended,More frequent rounding,Reorient patient,Room close to nurse's station,Toileting rounds,Update white board    Medication Interventions: Bed/chair exit alarm,Patient to call before getting OOB,Evaluate medications/consider consulting pharmacy,Teach patient to arise slowly    Elimination Interventions: Bed/chair exit alarm,Call light in reach,Patient to call for help with toileting needs,Toileting schedule/hourly rounds

## 2022-02-11 NOTE — PROGRESS NOTES
Problem: Pressure Injury - Risk of  Goal: *Prevention of pressure injury  Description: Document Roberto Carlos Scale and appropriate interventions in the flowsheet. Outcome: Progressing Towards Goal  Note: Pressure Injury Interventions:  Sensory Interventions: Assess changes in LOC,Assess need for specialty bed,Float heels    Moisture Interventions: Absorbent underpads,Apply protective barrier, creams and emollients    Activity Interventions: Assess need for specialty bed    Mobility Interventions: Assess need for specialty bed    Nutrition Interventions: Document food/fluid/supplement intake    Friction and Shear Interventions: Apply protective barrier, creams and emollients                Problem: Falls - Risk of  Goal: *Absence of Falls  Description: Document Gladys Fall Risk and appropriate interventions in the flowsheet.   Outcome: Progressing Towards Goal  Note: Fall Risk Interventions:  Mobility Interventions: Bed/chair exit alarm,Assess mobility with egress test,Patient to call before getting OOB    Mentation Interventions: Adequate sleep, hydration, pain control,Bed/chair exit alarm,Evaluate medications/consider consulting pharmacy,Room close to nurse's station    Medication Interventions: Assess postural VS orthostatic hypotension,Bed/chair exit alarm,Evaluate medications/consider consulting pharmacy,Patient to call before getting OOB    Elimination Interventions: Call light in reach,Bed/chair exit alarm,Patient to call for help with toileting needs

## 2022-02-11 NOTE — PROGRESS NOTES
I prayed with Laura Rivero and his family and celebrated with Laura Rivero the [address] and the prayers on commendation.   Father Mili Menendez

## 2022-02-11 NOTE — PROGRESS NOTES
Pulmonary, Critical Care, and Sleep Medicine     Patient Consult    Name: Zulay Cifuentes MRN: [id number]   : 1929 Hospital: [facility]   Date: 2022        IMPRESSION:   · Encephalopathy, Increased co2 levels. Rechecked pco2 and decreased to 68. Head Ct was negative for acute changes. Seems a bi better this am. Still with confusion. Seems more alert this am.   · Acute on Chronic Respiratory Failure-on NC oxygen when seen this am.   · Has recurrent pleural effusions. Seems stable. · COPD with acute exacerbation. · Stage iv non small cell lung cancer. Mets to bone and pleura. Has RLL collapse. Has trapped lung on the right side. · CHF, increased proBNP. · Felt to have anasarca. Seems a little better today with diuresis from yesterday. · Generalized weakness. · S/p Covid vaccine and booster. · Chronic Right Pleural effusion. Loculated pleural effusion,  Has pulmonary edema. At this point doubt pt would be able to cooperate for a thoracentesis. Doubt it will alleviate much dyspnea given the pt looks comfortable when seen this am. I am worried he has been with moderate chest pain with prior thoracentesis and may have trapped lung. · GERD  · Hx of smoking. · Pt is Do Not Resuscitate and DNI. · I discussed with pts daughter who was at bedside this am.   · Discussed with Dr. Taty Morales and Ms. Shanna Damon this am.   · I called and discussed with Guy Pratt ([phone]) via phone at 140pm. Will continue current course. NO plans for Thoracentesis as this point. RECOMMENDATIONS:   · Bipap as needed for comfort. · ON NC oxygen when seen. · Can use anxiolytics or pain meds as needed. Haldol prn   · I would not recommend a thoracentesis at this point. · Would encourage ongoing support with palliative care and consideration of hospice. · ON diuresis  · ON Zosyn for possible pneumonia. · Will follow prn over the weekend. Call if any questions.       Subjective: He was confused when seen this am. NO chest pain, no back pain, Able to follow simple commands. Pt was seen this am. HE appears to be more comfortable when seen this am.   Was on NC Oxygen when seen this am. He will open his eyes but not following any commands. He was very confused last pm. Not able to get further ROS Or HPI from pt. His daughter who was at bedside said he was confused last pm.       This patient has been seen and evaluated at the request of Dr. Kalyn Henriquez for above. Patient is a 80 y.o. male who was seen in Dr. Agnieszka Green office. Noted to have worsening fluid retention, worsening hypoxia. Was sent to ER. Per EHR has been worse over the last week. Has severe dyspnea with any exerton. No cough was reported. Noted to have worsening generalized weakness has not been L support himself and is fallen out of his chair several times at home, per his family.        Emergency room  Improved on 2 to 4 L nasal cannula oxygen  Was talking per nursing staff when he arrived, was a bit sleepy  Progressively lethargic, was minimally responsive, non verbal, not following commands when I saw  Head CT negative        Reviewed PAR notes from Dr. Iraida Schaefer:  First had lung cancer in 2017. He was note tolerating thoracentesis due to pain. May have trapped lung. Had 4 prior thoracentesis. Has been very short of breath on 2/8/2022. Had been starting a new agent per Dr. Elisa Gonzalez. Has new bone metastasis. Has been with increased leg swelling. Desats to 81% on RA. Has been getting worse. Had prior Thoracentesis on 10/2022.        Past Medical History:   Diagnosis Date    Cancer [facility])     Stage 4 Lung Cancer    GERD (gastroesophageal reflux disease)     Lymphedema     Urinary incontinence       Past Surgical History:   Procedure Laterality Date    HX ORTHOPAEDIC Left     Achilles     HX ORTHOPAEDIC Right     knee surery    HX ROTATOR CUFF REPAIR Left       Prior to Admission medications    Medication Sig Start Date End Date Taking? Authorizing Provider   alectinib 150 mg cap Take 4 Capsules by mouth two (2) times a day for 30 days. 22  Fazal Russo MD   alfuzosin SR (UROXATRAL) 10 mg SR tablet  21   Provider, Historical   oxyCODONE IR (ROXICODONE) 5 mg immediate release tablet Take 5 mg by mouth every four (4) hours as needed for Pain. Provider, Historical   senna-docusate (Vegetable Lax-Stool Softener) 8.6-50 mg per tablet Take 1 Tablet by mouth daily. Provider, Historical   acetaminophen/diphenhydramine (TYLENOL PM PO) Take 500 mg by mouth daily. At bedtime    Provider, Historical   esomeprazole magnesium (NEXIUM 24HR PO) Take  by mouth. Provider, Historical     No Known Allergies   Social History     Tobacco Use    Smoking status: Former Smoker     Quit date: 10/5/1991     Years since quittin.3    Smokeless tobacco: Never Used   Substance Use Topics    Alcohol use: Not Currently      History reviewed. No pertinent family history. Current Facility-Administered Medications   Medication Dose Route Frequency    balsam peru-castor oiL (VENELEX) ointment   Topical BID    pantoprazole (PROTONIX) tablet 40 mg  40 mg Oral ACB    sodium chloride (NS) flush 5-40 mL  5-40 mL IntraVENous Q8H    polyethylene glycol (MIRALAX) packet 17 g  17 g Oral DAILY    enoxaparin (LOVENOX) injection 40 mg  40 mg SubCUTAneous DAILY    furosemide (LASIX) injection 40 mg  40 mg IntraVENous BID    aspirin chewable tablet 81 mg  81 mg Oral DAILY    piperacillin-tazobactam (ZOSYN) 3.375 g in 0.9% sodium chloride (MBP/ADV) 100 mL MBP  3.375 g IntraVENous Q8H       Review of Systems:  Review of systems not obtained due to patient factors. Confused.      Objective:   Vital Signs:    Visit Vitals  BP (!) 105/50   Pulse 98   Temp 97.6 °F (36.4 °C)   Resp 20   Ht 5' 6\" (1.676 m)   Wt 58.1 kg (128 lb)   SpO2 94%   BMI 20.66 kg/m²       O2 Device: Nasal cannula   O2 Flow Rate (L/min): 2 l/min   Temp (24hrs), Av.7 °F (36.5 °C), Min:97.3 °F (36.3 °C), Max:97.9 °F (36.6 °C)       Intake/Output:   Last shift:      701 - 1900  In: 450 [P.O.:50; I.V.:400]  Out: 180 [Urine:180]  Last 3 shifts: 1901 -  07  In: 725 [P.O.:299; I.V.:100]  Out: 1475 [Urine:1475]    Intake/Output Summary (Last 24 hours) at 2022 1309  Last data filed at 2022 0954  Gross per 24 hour   Intake 749 ml   Output 780 ml   Net -31 ml      Physical Exam:   General:  Alert and interactive,  on on NC 2L Oxygen 94%. Head:  Normocephalic, without obvious abnormality, atraumatic. Eyes:  Conjunctivae/corneas clear. PERRL, EOMs intact. Nose: Nares normal. Septum midline. Mucosa normal. No drainage or sinus tenderness. Throat: Lips, mucosa, and tongue normal. Teeth and gums normal.   Neck: Supple, symmetrical, trachea midline, no adenopathy, thyroid: no enlargment/tenderness/nodules, no carotid bruit and no JVD. Back:   Symmetric, no curvature. ROM normal.   Lungs:   Has some decreased BS of the right base. Has good air movement on the left side. Has rhonchi anteriorly. Chest wall:  No tenderness or deformity. Heart:  Regular rate and rhythm, S1, S2 normal, no murmur, click, rub or gallop. Abdomen:   Soft, non-tender. Bowel sounds normal. No masses,  No organomegaly. Extremities: Extremities normal, atraumatic, no cyanosis. Has Trace edema, redness over shin areas. Redness over large toes. Pulses: 2+ and symmetric all extremities. Skin: Skin color, texture, turgor normal. No rashes or lesions   Lymph nodes: Cervical, supraclavicular, and axillary nodes normal.   Neurologic: He is able to follow simple commands. Data review:     No results found for this or any previous visit (from the past 24 hour(s)). Imaging:  I have personally reviewed the patients radiographs and have reviewed the reports:  2-8-2022: FINDINGS:     CHEST WALL: No mass or axillary lymphadenopathy.   THYROID: No nodule. MEDIASTINUM: No mass or lymphadenopathy. KEY: No mass or lymphadenopathy. THORACIC AORTA: No aneurysm. MAIN PULMONARY ARTERY: Normal in caliber. TRACHEA/BRONCHI: Cut off sign of right mainstem bronchus (axial image 33). ESOPHAGUS: No wall thickening or dilatation. HEART: Normal in size. Coronary artery calcification  PLEURA: Right basilar effusion loculated with air, loculated at lung base and  posteriorly. Small left pleural effusion. LUNGS: Multifocal airspace disease throughout the entire right lung. Right lower  lobe is collapsed. INCIDENTALLY IMAGED UPPER ABDOMEN: Chronic granulomata. Exophytic 2 cm left  renal cyst.  BONES: Right scapular. 8 mm sclerotic focus (sagittal image 18). Sclerotic focus  in the anterior aspect of the T10 vertebra (sagittal image 74).    IMPRESSION  Cut off sign of the right mainstem bronchus in association with right lower lobe  collapse and multifocal airspace disease in the right upper lobe and right  middle lobe. Associated loculated pleural effusion     Indeterminate sclerotic focus in the T10 vertebra. Recommend correlation with  bone scan.     Incidental small left pleural effusion with left basilar subsegmental  atelectasis and coronary artery calcification.         Yanni Fallon MD

## 2022-02-11 NOTE — PROGRESS NOTES
Palliative Medicine Consult  Nishant: [phone])    Patient Name: Wood Lennon  YOB: 1929    Date of Initial Consult: 2/8/22  Reason for Consult: Care Decisions  Requesting Provider: Brandon Oliver MD  Primary Care Physician: Kaley Haq MD     SUMMARY:   Wood Lennon is a 80 y.o. with a past history of stage IV cancer with mets to bone and pleura, diagnosed 5 years ago, chronic pleural effusion with periodic thoracentesis (last on was Oct 2021), who was admitted on 2/8/2022 from home (via the [facility] Pulmonary clinic) with a diagnosis of acute metabolic encephalopathy due to hypercarbia, 2/2 acute on chronic respiratory failure with hypoxia and hypercarbia due to CHF, COPD, and possible post obstructive RLL PNA in the setting of lung cancer. Current medical issues leading to Palliative Medicine involvement include: goals of care in the setting of frailty and worsening respiratory status     PALLIATIVE DIAGNOSES:   1. Goals of care  2. Delirium  3. Restlessness  4. Palliative Encounter  5. End of life care     PLAN:   1. Mr Rena Sprague is more alert today, appears well, not in any distress, but is perseverating on getting his 3rd COVID shot, and that is all he wanted to talk about  2. His daughter Zoraida Friedman was at bedside- I talked with her outside the room. She was hoping that giving him haldol would help with the perseveration- I told her that it would not likely treat that, and as he appears comfortable and is not anxious, restless, or agitated I am not sure it would do much other than potentially make him sleepy  3. She shared that they are ready to move forward with a comfort oriented approach. She shared that these last few years with her dad have been a gift,   She asked about enrolling in \"palliative\" but I shared with her that this is not likely the service that she wants as we do not provide care in the home, but Hospice would be a good option  4.  Zoraida Friedman is very familiar with hospice support as she had this for her mom, and she is also open to a conversation with her dad about taking him home with her if he is open to this suggestion. Have already asked Case Management to reach out to [facility] to see what hospice support would look like there. 5. For now, he appears comfortable and has no complaints for me, other than wanting his booster vaccine. Will be available for support, but otherwise will just see how the conversations with hospice play out. 6. Initial consult note routed to primary continuity provider and/or primary health care team members  7. Communicated plan of care with: Palliative IDTDonald 192 Team    Addendum:  Received a call from nursing that Mr Lonny Ortiz is no longer responsive and his family has asked to fully transition to comfort. Put orders in just in case he needs meds, but at this point he remains comfortable. Theron Mckeon remains at bedside, Father Gavino Carr is present as well, so did not interrupt, but from the door Mr Lonny Ortiz appears comfortable     GOALS OF CARE / TREATMENT PREFERENCES:     GOALS OF CARE:  Patient/Health Care Proxy Stated Goals: Comfort    TREATMENT PREFERENCES:   Code Status: DNR    Advance Care Planning:  [x] [facility] Interdisciplinary Team has updated the ACP Navigator with Health Care Decision Maker and Patient Capacity      Primary Decision Maker: Raúl Landry - Daughter - [phone]    Primary Decision Maker: Jessica Hollis - Daughter - [phone]    Primary Decision Maker (Active): Geo Song - Daughter - [phone]   Along with their sister Kenny Brower    No flowsheet data found. Medical Interventions: Comfort measures       Other:    As far as possible, the palliative care team has discussed with patient / health care proxy about goals of care / treatment preferences for patient.      HISTORY:     History obtained from: chart, daughter    CHIEF COMPLAINT: none    HPI/SUBJECTIVE:    The patient is:   [] Verbal and participatory  [x] Non-participatory due to:   Condition    Clinical Pain Assessment (nonverbal scale for severity on nonverbal patients):   Clinical Pain Assessment  Severity: 0     Activity (Movement): Lying quietly, normal position    Duration: for how long has pt been experiencing pain (e.g., 2 days, 1 month, years)  Frequency: how often pain is an issue (e.g., several times per day, once every few days, constant)     FUNCTIONAL ASSESSMENT:     Palliative Performance Scale (PPS):  PPS: 20       PSYCHOSOCIAL/SPIRITUAL SCREENING:     Palliative IDT has assessed this patient for cultural preferences / practices and a referral made as appropriate to needs (Cultural Services, Patient Advocacy, Ethics, etc.)    Any spiritual / Religion concerns:  [] Yes /  [x] No   If \"Yes\" to discuss with pastoral care during IDT     Does caregiver feel burdened by caring for their loved one:   [] Yes /  [x] No /  [] No Caregiver Present    If \"Yes\" to discuss with social work during IDT    Anticipatory grief assessment:   [x] Normal  / [] Maladaptive     If \"Maladaptive\" to discuss with social work during IDT    ESAS Anxiety: Anxiety: 1    ESAS Depression: Depression: 0        REVIEW OF SYSTEMS:     Positive and pertinent negative findings in ROS are noted above in HPI. The following systems were [x] reviewed / [] unable to be reviewed as noted in HPI  Other findings are noted below. Systems: constitutional, ears/nose/mouth/throat, respiratory, gastrointestinal, genitourinary, musculoskeletal, integumentary, neurologic, psychiatric, endocrine. Positive findings noted below.   Modified ESAS Completed by: provider   Fatigue: 0     Depression: 0 Pain: 0   Anxiety: 1 Nausea: 0   Anorexia: 0 Dyspnea: 0                    PHYSICAL EXAM:     From RN flowsheet:  Wt Readings from Last 3 Encounters:   02/09/22 128 lb (58.1 kg)   01/04/22 133 lb (60.3 kg)   10/21/21 120 lb (54.4 kg)     Blood pressure (!) 105/50, pulse 98, temperature 97.6 °F (36.4 °C), resp. rate 20, height 5' 6\" (1.676 m), weight 128 lb (58.1 kg), SpO2 94 %. Pain Scale 1: Numeric (0 - 10)  Pain Intensity 1: 0                 Last bowel movement, if known:     Constitutional: now unresponsive  Cardiovascular: regular rhythm, distal pulses intact  Respiratory: breathing not labored  Musculoskeletal: no deformity, no tenderness to palpation  Skin: warm, dry  Other:       HISTORY:     Active Problems:    Acute left-sided CHF (congestive heart failure) ([facility]) (2022)      Goals of care, counseling/discussion ()      Obtunded ()      Delirium ()      Restlessness ()      Palliative care by specialist ()      Past Medical History:   Diagnosis Date    Cancer ([facility] [address]     Stage 4 Lung Cancer    GERD (gastroesophageal reflux disease)     Lymphedema     Urinary incontinence       Past Surgical History:   Procedure Laterality Date    HX ORTHOPAEDIC Left     Achilles     HX ORTHOPAEDIC Right     knee surery    HX ROTATOR CUFF REPAIR Left       History reviewed. No pertinent family history. History reviewed, no pertinent family history.   Social History     Tobacco Use    Smoking status: Former Smoker     Quit date: 10/5/1991     Years since quittin.3    Smokeless tobacco: Never Used   Substance Use Topics    Alcohol use: Not Currently     No Known Allergies   Current Facility-Administered Medications   Medication Dose Route Frequency    glycopyrrolate (ROBINUL) injection 0.1 mg  0.1 mg IntraVENous Q4H PRN    morphine injection 1 mg  1 mg IntraVENous Q15MIN PRN    balsam peru-castor oiL (VENELEX) ointment   Topical BID    haloperidol lactate (HALDOL) injection 1 mg  1 mg IntraVENous Q6H PRN    sodium chloride (NS) flush 5-40 mL  5-40 mL IntraVENous PRN    furosemide (LASIX) injection 40 mg  40 mg IntraVENous BID          LAB AND IMAGING FINDINGS:     Lab Results   Component Value Date/Time    WBC 15.8 (H) 02/10/2022 02:17 AM    HGB 13.2 02/10/2022 02:17 AM    PLATELET 959 02/10/2022 02:17 AM     Lab Results   Component Value Date/Time    Sodium 136 02/10/2022 02:17 AM    Potassium 4.2 02/10/2022 02:17 AM    Chloride 96 (L) 02/10/2022 02:17 AM    CO2 31 02/10/2022 02:17 AM    BUN 36 (H) 02/10/2022 02:17 AM    Creatinine 1.67 (H) 02/10/2022 02:17 AM    Calcium 8.4 (L) 02/10/2022 02:17 AM    Magnesium 2.0 02/10/2022 02:17 AM    Phosphorus 6.0 (H) 02/10/2022 02:17 AM      Lab Results   Component Value Date/Time    Alk. phosphatase 149 (H) 02/10/2022 02:17 AM    Protein, total 5.4 (L) 02/10/2022 02:17 AM    Albumin 2.5 (L) 02/10/2022 02:17 AM    Globulin 2.9 02/10/2022 02:17 AM     No results found for: INR, PTMR, PTP, PT1, PT2, APTT, INREXT, INREXT   No results found for: IRON, FE, TIBC, IBCT, PSAT, FERR   Lab Results   Component Value Date/Time    pH 7.28 (L) 02/09/2022 11:13 AM    PCO2 69 (H) 02/09/2022 11:13 AM    PO2 93 02/09/2022 11:13 AM     No components found for: GLPOC   No results found for: CPK, CKMB             Total time: 35m  Counseling / coordination time, spent as noted above: 30m  > 50% counseling / coordination?: y    Prolonged service was provided for  []30 min   []75 min in face to face time in the presence of the patient, spent as noted above. Time Start:   Time End:   Note: this can only be billed with 22654 (initial) or 74196 (follow up). If multiple start / stop times, list each separately.

## 2022-02-11 NOTE — PROGRESS NOTES
While walking through [place],  noticed one of patient's daughters visiting. She shared that the family has a strong belief system. Patient's daughter, Nessa Ch, had expressed yesterday that patient would want Holy Communion. He was awake this morning and responsive enough to emphatically decline offer to receive Holy Communion. He is receptive to Father Abhishek Taniyarosalva visiting later today to anoint him with the [address]. Offered words of reassurance and assurance of prayer.    available upon referral by nurse or by patient request.       GILLIAN Mcneill, [facility], Staff [address]    [address] Paging Service  [phone])

## 2022-02-11 NOTE — PROGRESS NOTES
Bedside and Verbal shift change report given to Swati Osorio (oncoming nurse) by Charmel Osgood (offgoing nurse). Report included the following information SBAR, Kardex, Procedure Summary, Intake/Output, MAR and Recent Results. 0745:  Patient oxygen saturations at 76% on 3 liters nasal cannula, patient placed on BIPAP and IV Lasix given. 0900: Patient now on nasal cannula per respiratory therapy. Patient turned and repositioned in the bed, patient denies pain but just says he is uncomfortable. PO medications on hold at this time, MD aware    0935: Chest xray done at bedside    1100: Daughter at bedside, palliative care member there to talk with patient and family    [phone]: Daughter concerned about patient's condition. Patient's eyes fixed and patient not responding to voice or sternal rub. Nurses at bedside. Updated palliative care team on patient's condition. Daughter wanting to take oxygen off patient and turn off cardiac monitoring. Per palliative ok to remove oxygen if ok with family. Cardiac monitoring turned off in patient's room but remains on in nursing station. 1315: Patient now comfort measures, Robinol and Morphine ordered for patient PRN. 1342: Patient given PRN Morphine, nurses remain at beside.  Patient passed at 13:52.    1555: Dr. Latricia Small at bedside to pronounce death

## 2022-02-11 NOTE — PROGRESS NOTES
INTERNAL MEDICINE ATTENDING NOTE     Patient Name: Marlys Diaz   : 1929   Admit: 2022    ASSESSMENT / PLAN    1. Acute on chronic respiratory failure, with hypoxia and hypercarbia POA: BiPAP as needed. Pulmonology following--appreciate assistance. 2. R pleural effusion: Family is hoping he will get well enough to tolerate thoracentesis. 3. Acute left-sided CHF (congestive heart failure) ([address] (2022): Diuresis. 4. Possible post-obstructive pneumonia: On zosyn. However, normal procalcitonin. 5. Metabolic encephalopathy: Head CT negative. Manage respiratory failure. 6. COPD: Underlying/POA. 7. 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 Recently started a new chemotherapy with alectinib. 8. GERD: PPI. 9. DVT ppx with lovenox. 10. DNR/DNI. Palliative care notes reviewed. Family not quite ready for hospice. I have discussed his case with his daughter today, as well as with Dr. Remington Etienne. SUBJECTIVE: Mr. Marlys Diaz was seen by me today during rounds. At this time, he is asleep but rousable. His O2 sats dropped, and he was put on BiPAP for a time; he is off this now. He is awake and responds to questions. \"I want to get comfortable\"--asks to be put into a seated position. Denies pain. ROS otherwise unremarkable. OBJECTIVE: Blood pressure (!) 142/73, pulse (!) 109, temperature 97.3 °F (36.3 °C), resp. rate 18, height 5' 6\" (1.676 m), weight 128 lb (58.1 kg), SpO2 91 %. Gen: Patient is in no acute distress. Lungs: Coarse upper airway sounds. Pt is on oxygen by nasal cannula; off BiPAP. Heart: RRR. Abd: S, NT, ND, BS present. Exremities: Warm. No results found for this or any previous visit (from the past 12 hour(s)).      CT chest WO contrast:  Cut off sign of the right mainstem bronchus in association with right lower lobe  collapse and multifocal airspace disease in the right upper lobe and right  middle lobe. Associated loculated pleural effusion     Indeterminate sclerotic focus in the T10 vertebra. Recommend correlation with  bone scan.     Incidental small left pleural effusion with left basilar subsegmental  atelectasis and coronary artery calcification. CT head WO contrast:   No acute process seen     CXR:   New pulmonary edema. New right pleural effusion. A superimposed  infiltrate cannot be excluded    Lily Thomas MD, FACP  Best contact is via Pager: [phone], or via hospital  at [phone]. I can also be reached by [employer].

## 2022-02-11 NOTE — PROGRESS NOTES
End of Shift Note    Bedside shift change report given to Al (oncoming nurse) by Saeed Dumont (offgoing nurse). Report included the following information SBAR, Kardex, Intake/Output, MAR, Recent Results and Cardiac Rhythm Afib    Shift worked:  7 am to 7 pm     Shift summary and any significant changes:    CHG completed. Pt more alert during shift; AxO x4, but has periods of confusion. Pt often yelled help throughout the shift to increase comfort and be repositioned in the bed often. Initially oxygen was 3 L at beginning of shift; RN weaned oxygen to 2 L and pt sats remain above 95%; pt does get dyspneic on exertion. Pt requested thoracentesis; however, RN spoke with MD Anthony Gonsalves and there are no plans to do thoracentesis now. Pt had minimal urine output at beginning of shift. MD notified; RN bladder scanned pt and 778 mL was retained; straight cath performed and 775 of urine removed; post straight cath pt has urinated 300 mL on his own. Speech therapy came to evaluate pt for concerns for aspirations; speech cleared pt to continue diet and take small bites/ small sips of liquids, and pills whole in applesauce. PRN haldol given once during shift.    Concerns for physician to address:  none     Zone phone for oncoming shift:  [phone]     Activity:  Activity Level: Bed Rest  Number times ambulated in hallways past shift: 0  Number of times OOB to chair past shift: 0    Cardiac:   Cardiac Monitoring: Yes      Cardiac Rhythm: Atrial Fib    Access:   Current line(s): PIV     Genitourinary:   Urinary status: incontinent and external catheter    Respiratory:   O2 Device: Nasal cannula;   Chronic home O2 use?: NO  Incentive spirometer at bedside: NO     GI:  Last Bowel Movement Date: 02/07/22 (per daughter)  Current diet:  ADULT DIET Regular; Low Fat/Low Chol/High Fiber/2 gm Na  ADULT ORAL NUTRITION SUPPLEMENT Breakfast, Dinner; Standard High Calorie/High Protein  Passing flatus: YES  Tolerating current diet: YES       Pain Management:   Patient states pain is manageable on current regimen: N/A    Skin:  Roberto Carlos Score: 12  Interventions: turn team, speciality bed, float heels, PT/OT consult and internal/external urinary devices    Patient Safety:  Fall Score:  Total Score: 4  Interventions: bed/chair alarm and stay with me (per policy)  High Fall Risk: Yes    Length of Stay:  Expected LOS: 3d 14h  Actual LOS: [id number] [facility]

## 2022-02-11 NOTE — PROGRESS NOTES
NAME: Andrea Gallagher   :  1929   MRN:  [id number]     Death Note    Called to pronounce patient. No response to noxious stimuli. No spontaneous breath sounds nor cardiac sounds.    Time Pronounced:  1550  D/C Summary and death certificate to be completed by Attending MD

## 2022-02-14 NOTE — DISCHARGE SUMMARY
Physician Discharge Summary     Patient ID:  Janet Hopkins  [id number]  48 y.o.  2/7/1929    Admit date: 2/8/2022    Discharge date: 2/11/2022    Admission Diagnoses: Acute left-sided CHF (congestive heart failure) ([facility] [address] [I50.1]    Discharge Diagnoses:  Principal Diagnosis                                               Active Problems:    Acute left-sided CHF (congestive heart failure) ([facility] [address] (2/8/2022)      Goals of care, counseling/discussion ()      Obtunded ()      Delirium ()      Restlessness ()      Palliative care by specialist ()      Restless ()      End of life care ()       Consults: Pulmonary/Intensive care and Palliative Care    Significant Diagnostic Studies:     CT chest WO contrast:  Cut off sign of the right mainstem bronchus in association with right lower lobe  collapse and multifocal airspace disease in the right upper lobe and right  middle lobe. Associated loculated pleural effusion     Indeterminate sclerotic focus in the T10 vertebra. Recommend correlation with  bone scan.     Incidental small left pleural effusion with left basilar subsegmental  atelectasis and coronary artery calcification.      CT head WO contrast:   No acute process seen     CXR:   New pulmonary edema. New right pleural effusion.  A superimposed  infiltrate cannot be excluded    HPI (excerpted from Dr. Gabriel Failing note):     CHIEF COMPLAINT: Sent from pulmonary clinic with SOB and RA sats 81%     HISTORY OF PRESENT ILLNESS:     80year-old male     Patient not on home oxygen     Stage IV cancer with mets to bone and pleural, Diagnosed 5 years ago     History of right pleural effusion last documented drainage was in October 2021              Just started a new chemotherapy agent alectinib this week              I do not see any studies or cytology     Seen in pulmonology clinic today, was hypoxic sats 81% on room air, short of breath  Sent to ED  Spoke with daughter, patient has been gaining weight and becoming edematous over the past week  Edema initially in the Left upper extremity, now more generalized  Very short of breath walking over the past few days, any minimal exertion gets very winded  No cough  Generalized weakness has not been L support himself and is fallen out of his chair several times  No prior CHF or CAD  Vaccinated against Covid-19 x 2 and received the booster     Emergency room  Improved on 2 to 4 L nasal cannula oxygen  Was talking per nursing staff when he arrived, was a bit sleepy  Progressively lethargic, was minimally responsive, non verbal, not following commands when I saw  Head CT negative  ABG on 3 L NC pH 7.21, pCO2 90, pO2 106  pCXR pulmonary edema, mod to large right effusion  CT chest Cut off sign of the right mainstem bronchus with right lower lobe collapse               multifocal airspace disease in the right upper lobe and right middle lobe.              Associated loculated pleural effusion  pBNP 3312  Placed on BIPAP  IV lasix  We were called to admit the patient  Spoke with daughter at [address] [place] [address] Course: Mr. Parker Schwarz is a patient of mine who presented with the problems above. 1. Acute on chronic respiratory failure, with hypoxia and hypercarbia POA: He was admitted and placed on BiPAP. Pulmonology following. He seemed to improve and was more alert this morning. In the afternoon he abruptly worsened and ; He was DNR/DNI. The family had met with palliative care team, and were in agreement with a comfort care approach. They were beginning to look into the idea of home hospice. 2. R pleural effusion: Family is hoping he will get well enough to tolerate thoracentesis. 3. Acute left-sided CHF (congestive heart failure) ([facility] [address] (2022): Diuresis. 4. Possible post-obstructive pneumonia: On zosyn. However, normal procalcitonin. 5. Metabolic encephalopathy: Likely due to hypercarbia. Head CT negative. 6. COPD: This was an underlying problem.     7. 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 Recently he has started a new chemotherapy with alectinib. 8. GERD: Treated with PPI. Discharge Exam:  See death pronouncement note. Disposition: . Cause of death:  Non small cell lung cancer.        Signed:  Elie Mehta MD  2022  4:04 PM